# Patient Record
Sex: FEMALE | Race: WHITE | NOT HISPANIC OR LATINO | Employment: STUDENT | URBAN - METROPOLITAN AREA
[De-identification: names, ages, dates, MRNs, and addresses within clinical notes are randomized per-mention and may not be internally consistent; named-entity substitution may affect disease eponyms.]

---

## 2017-08-28 DIAGNOSIS — Z30.41 ENCOUNTER FOR SURVEILLANCE OF CONTRACEPTIVE PILLS: ICD-10-CM

## 2017-08-28 RX ORDER — DROSPIRENONE AND ETHINYL ESTRADIOL 0.02-3(28)
1 KIT ORAL DAILY
Qty: 28 TABLET | Refills: 2 | Status: SHIPPED | OUTPATIENT
Start: 2017-08-28 | End: 2017-11-06 | Stop reason: SDUPTHER

## 2017-11-06 ENCOUNTER — OFFICE VISIT (OUTPATIENT)
Dept: OBSTETRICS AND GYNECOLOGY | Facility: CLINIC | Age: 22
End: 2017-11-06
Payer: COMMERCIAL

## 2017-11-06 ENCOUNTER — PATIENT MESSAGE (OUTPATIENT)
Dept: OBSTETRICS AND GYNECOLOGY | Facility: CLINIC | Age: 22
End: 2017-11-06

## 2017-11-06 VITALS
DIASTOLIC BLOOD PRESSURE: 82 MMHG | SYSTOLIC BLOOD PRESSURE: 130 MMHG | BODY MASS INDEX: 33.02 KG/M2 | HEIGHT: 62 IN | WEIGHT: 179.44 LBS

## 2017-11-06 DIAGNOSIS — Z30.41 ENCOUNTER FOR SURVEILLANCE OF CONTRACEPTIVE PILLS: ICD-10-CM

## 2017-11-06 DIAGNOSIS — Z01.419 ENCOUNTER FOR GYNECOLOGICAL EXAMINATION (GENERAL) (ROUTINE) WITHOUT ABNORMAL FINDINGS: ICD-10-CM

## 2017-11-06 DIAGNOSIS — Z12.4 SCREENING FOR MALIGNANT NEOPLASM OF CERVIX: Primary | ICD-10-CM

## 2017-11-06 PROCEDURE — 99395 PREV VISIT EST AGE 18-39: CPT | Mod: S$GLB,,, | Performed by: OBSTETRICS & GYNECOLOGY

## 2017-11-06 PROCEDURE — 99999 PR PBB SHADOW E&M-EST. PATIENT-LVL II: CPT | Mod: PBBFAC,,, | Performed by: OBSTETRICS & GYNECOLOGY

## 2017-11-06 PROCEDURE — 88175 CYTOPATH C/V AUTO FLUID REDO: CPT

## 2017-11-06 RX ORDER — DROSPIRENONE AND ETHINYL ESTRADIOL 0.02-3(28)
1 KIT ORAL DAILY
Qty: 84 TABLET | Refills: 3 | Status: SHIPPED | OUTPATIENT
Start: 2017-11-06 | End: 2018-10-30 | Stop reason: SDUPTHER

## 2017-11-06 RX ORDER — IBUPROFEN 100 MG/5ML
1000 SUSPENSION, ORAL (FINAL DOSE FORM) ORAL DAILY
COMMUNITY
End: 2023-02-15

## 2017-11-06 NOTE — PROGRESS NOTES
Subjective:       Patient ID: Natividad Zaldivar is a 22 y.o. female.    Chief Complaint:  Well Woman (Annual Exam, Declines Std screening (No sex since last screening)    -- Last pap 16, Negative )      Patient Active Problem List   Diagnosis    Abdominal pain, other specified site    Acne    Menorrhagia       History of Present Illness  22 y.o. yo  here for annual exam. No gyn complaints. Doing well. Wants testing for ADHD. Happy with OCP. 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   Date of Last Pap: 10/5/2016    Review of Systems  Review of Systems   Constitutional: Negative for fatigue and unexpected weight change.   Respiratory: Negative for shortness of breath.    Cardiovascular: Negative for chest pain.   Gastrointestinal: Negative for abdominal pain, constipation, diarrhea, nausea and vomiting.   Genitourinary: Negative for dysuria.   Musculoskeletal: Negative for back pain.   Skin: Negative for rash.   Neurological: Negative for headaches.   Hematological: Does not bruise/bleed easily.   Psychiatric/Behavioral: Negative for behavioral problems.        Objective:   Physical Exam:   Constitutional: She is oriented to person, place, and time. Vital signs are normal. She appears well-developed and well-nourished. No distress.        Pulmonary/Chest: She exhibits no mass. Right breast exhibits no mass, no nipple discharge, no skin change, no tenderness, no bleeding and no swelling. Left breast exhibits no mass, no nipple discharge, no skin change, no tenderness, no bleeding and no swelling. Breasts are symmetrical.        Abdominal: Soft.  Normal appearance and bowel sounds are normal. She exhibits no distension and no mass. There is no tenderness. There is no rebound.     Genitourinary: Vagina normal and uterus normal. There is no rash, tenderness, lesion or injury on the right labia. There is no rash, tenderness, lesion or injury on the left labia. Uterus is not deviated, not enlarged, not fixed, not tender, not hosting fibroids and not experiencing uterine prolapse. Cervix is normal. Right adnexum displays no mass, no tenderness and no fullness. Left adnexum displays no mass, no tenderness and no fullness. No erythema, tenderness, rectocele, cystocele or unspecified prolapse of vaginal walls in the vagina. No vaginal discharge found. Cervix exhibits no motion tenderness, no discharge and no friability.           Musculoskeletal: Normal range of motion and moves all extremeties.      Lymphadenopathy:     She has no axillary adenopathy.        Right: No supraclavicular adenopathy present.        Left: No supraclavicular adenopathy present.    Neurological: She is alert and oriented to person, place, and time.    Skin: Skin is warm and dry.    Psychiatric: She has a normal mood and affect. Her behavior is normal. Judgment normal.        Assessment/ Plan:     1. Screening for malignant neoplasm of cervix  Liquid-based pap smear, screening   2. Encounter for surveillance of contraceptive pills  drospirenone-ethinyl estradiol (KHADIJAH) 3-0.02 mg per tablet   3. Encounter for gynecological examination (general) (routine) without abnormal findings         Follow-up with me in 1 year

## 2018-10-30 ENCOUNTER — PATIENT MESSAGE (OUTPATIENT)
Dept: OBSTETRICS AND GYNECOLOGY | Facility: CLINIC | Age: 23
End: 2018-10-30

## 2018-10-30 DIAGNOSIS — Z30.41 ENCOUNTER FOR SURVEILLANCE OF CONTRACEPTIVE PILLS: ICD-10-CM

## 2018-10-31 RX ORDER — DROSPIRENONE AND ETHINYL ESTRADIOL 0.02-3(28)
1 KIT ORAL DAILY
Qty: 84 TABLET | Refills: 0 | Status: SHIPPED | OUTPATIENT
Start: 2018-10-31 | End: 2019-01-18 | Stop reason: SDUPTHER

## 2019-01-18 ENCOUNTER — OFFICE VISIT (OUTPATIENT)
Dept: OBSTETRICS AND GYNECOLOGY | Facility: CLINIC | Age: 24
End: 2019-01-18
Payer: COMMERCIAL

## 2019-01-18 VITALS
HEIGHT: 62 IN | DIASTOLIC BLOOD PRESSURE: 80 MMHG | WEIGHT: 189.94 LBS | SYSTOLIC BLOOD PRESSURE: 122 MMHG | BODY MASS INDEX: 34.95 KG/M2

## 2019-01-18 DIAGNOSIS — Z12.4 ENCOUNTER FOR PAPANICOLAOU SMEAR FOR CERVICAL CANCER SCREENING: Primary | ICD-10-CM

## 2019-01-18 DIAGNOSIS — Z01.419 ENCOUNTER FOR GYNECOLOGICAL EXAMINATION (GENERAL) (ROUTINE) WITHOUT ABNORMAL FINDINGS: ICD-10-CM

## 2019-01-18 DIAGNOSIS — Z30.41 ENCOUNTER FOR SURVEILLANCE OF CONTRACEPTIVE PILLS: ICD-10-CM

## 2019-01-18 PROCEDURE — 99999 PR PBB SHADOW E&M-EST. PATIENT-LVL III: CPT | Mod: PBBFAC,,, | Performed by: OBSTETRICS & GYNECOLOGY

## 2019-01-18 PROCEDURE — 99395 PR PREVENTIVE VISIT,EST,18-39: ICD-10-PCS | Mod: S$GLB,,, | Performed by: OBSTETRICS & GYNECOLOGY

## 2019-01-18 PROCEDURE — 99999 PR PBB SHADOW E&M-EST. PATIENT-LVL III: ICD-10-PCS | Mod: PBBFAC,,, | Performed by: OBSTETRICS & GYNECOLOGY

## 2019-01-18 PROCEDURE — 99395 PREV VISIT EST AGE 18-39: CPT | Mod: S$GLB,,, | Performed by: OBSTETRICS & GYNECOLOGY

## 2019-01-18 PROCEDURE — 88175 CYTOPATH C/V AUTO FLUID REDO: CPT

## 2019-01-18 RX ORDER — DROSPIRENONE AND ETHINYL ESTRADIOL 0.02-3(28)
1 KIT ORAL DAILY
Qty: 84 TABLET | Refills: 3 | Status: SHIPPED | OUTPATIENT
Start: 2019-01-18 | End: 2021-10-15 | Stop reason: SDUPTHER

## 2019-01-18 NOTE — PROGRESS NOTES
Subjective:       Patient ID: Natividad Zaldivar is a 23 y.o. female.    Chief Complaint:  Annual Exam (last pap 2017 normal)      Patient Active Problem List   Diagnosis    Abdominal pain, other specified site    Acne    Menorrhagia       History of Present Illness  23 y.o. yo  here for annual exam. Has been off OCP and wants to restart. Periods were irregular prior to OCP and dx with PCOS in the past. Has been on OCP for awhile and then got off when school clinic told her to stop because BP was up. Never had BP problems before on OCP. Rec ok to restart OCP and monitor BP. All questions answered.     Past Medical History:   Diagnosis Date    Acne     Menorrhagia        Past Surgical History:   Procedure Laterality Date    WISDOM TOOTH EXTRACTION  2015       OB History    Para Term  AB Living   0 0 0 0 0 0   SAB TAB Ectopic Multiple Live Births   0 0 0 0           Obstetric Comments   Menarche 10       Patient's last menstrual period was 2018.   Date of Last Pap: 2017    Review of Systems  Review of Systems   Constitutional: Negative for fatigue and unexpected weight change.   Respiratory: Negative for shortness of breath.    Cardiovascular: Negative for chest pain.   Gastrointestinal: Negative for abdominal pain, constipation, diarrhea, nausea and vomiting.   Genitourinary: Negative for dysuria.   Musculoskeletal: Negative for back pain.   Skin: Negative for rash.   Neurological: Negative for headaches.   Hematological: Does not bruise/bleed easily.   Psychiatric/Behavioral: Negative for behavioral problems.        Objective:   Physical Exam:   Constitutional: She is oriented to person, place, and time. Vital signs are normal. She appears well-developed and well-nourished. No distress.        Pulmonary/Chest: She exhibits no mass. Right breast exhibits no mass, no nipple discharge, no skin change, no tenderness, no bleeding and no swelling. Left breast exhibits no mass, no nipple  discharge, no skin change, no tenderness, no bleeding and no swelling. Breasts are symmetrical.        Abdominal: Soft. Normal appearance and bowel sounds are normal. She exhibits no distension and no mass. There is no tenderness. There is no rebound.     Genitourinary: Vagina normal and uterus normal. There is no rash, tenderness, lesion or injury on the right labia. There is no rash, tenderness, lesion or injury on the left labia. Uterus is not deviated, not enlarged, not fixed, not tender, not hosting fibroids and not experiencing uterine prolapse. Cervix is normal. Right adnexum displays no mass, no tenderness and no fullness. Left adnexum displays no mass, no tenderness and no fullness. No erythema, tenderness, rectocele, cystocele or unspecified prolapse of vaginal walls in the vagina. No vaginal discharge found. Cervix exhibits no motion tenderness, no discharge and no friability.           Musculoskeletal: Normal range of motion and moves all extremeties.      Lymphadenopathy:     She has no axillary adenopathy.        Right: No supraclavicular adenopathy present.        Left: No supraclavicular adenopathy present.    Neurological: She is alert and oriented to person, place, and time.    Skin: Skin is warm and dry.    Psychiatric: She has a normal mood and affect. Her behavior is normal. Judgment normal.        Assessment/ Plan:     1. Encounter for Papanicolaou smear for cervical cancer screening  Liquid-based pap smear, screening   2. Encounter for surveillance of contraceptive pills  drospirenone-ethinyl estradiol (KHADIJAH) 3-0.02 mg per tablet   3. Encounter for gynecological examination (general) (routine) without abnormal findings         Follow-up with me in 1 year

## 2021-10-15 ENCOUNTER — OFFICE VISIT (OUTPATIENT)
Dept: OBSTETRICS AND GYNECOLOGY | Facility: CLINIC | Age: 26
End: 2021-10-15
Attending: OBSTETRICS & GYNECOLOGY
Payer: COMMERCIAL

## 2021-10-15 VITALS
HEIGHT: 62 IN | DIASTOLIC BLOOD PRESSURE: 80 MMHG | SYSTOLIC BLOOD PRESSURE: 122 MMHG | BODY MASS INDEX: 37.99 KG/M2 | WEIGHT: 206.44 LBS

## 2021-10-15 DIAGNOSIS — Z01.419 ENCOUNTER FOR GYNECOLOGICAL EXAMINATION (GENERAL) (ROUTINE) WITHOUT ABNORMAL FINDINGS: ICD-10-CM

## 2021-10-15 DIAGNOSIS — Z30.41 ENCOUNTER FOR SURVEILLANCE OF CONTRACEPTIVE PILLS: ICD-10-CM

## 2021-10-15 DIAGNOSIS — Z12.4 ENCOUNTER FOR PAPANICOLAOU SMEAR FOR CERVICAL CANCER SCREENING: Primary | ICD-10-CM

## 2021-10-15 PROCEDURE — 3008F PR BODY MASS INDEX (BMI) DOCUMENTED: ICD-10-PCS | Mod: CPTII,S$GLB,, | Performed by: OBSTETRICS & GYNECOLOGY

## 2021-10-15 PROCEDURE — 3074F PR MOST RECENT SYSTOLIC BLOOD PRESSURE < 130 MM HG: ICD-10-PCS | Mod: CPTII,S$GLB,, | Performed by: OBSTETRICS & GYNECOLOGY

## 2021-10-15 PROCEDURE — 3074F SYST BP LT 130 MM HG: CPT | Mod: CPTII,S$GLB,, | Performed by: OBSTETRICS & GYNECOLOGY

## 2021-10-15 PROCEDURE — 99395 PREV VISIT EST AGE 18-39: CPT | Mod: S$GLB,,, | Performed by: OBSTETRICS & GYNECOLOGY

## 2021-10-15 PROCEDURE — 1159F PR MEDICATION LIST DOCUMENTED IN MEDICAL RECORD: ICD-10-PCS | Mod: CPTII,S$GLB,, | Performed by: OBSTETRICS & GYNECOLOGY

## 2021-10-15 PROCEDURE — 1160F PR REVIEW ALL MEDS BY PRESCRIBER/CLIN PHARMACIST DOCUMENTED: ICD-10-PCS | Mod: CPTII,S$GLB,, | Performed by: OBSTETRICS & GYNECOLOGY

## 2021-10-15 PROCEDURE — 1160F RVW MEDS BY RX/DR IN RCRD: CPT | Mod: CPTII,S$GLB,, | Performed by: OBSTETRICS & GYNECOLOGY

## 2021-10-15 PROCEDURE — 99395 PR PREVENTIVE VISIT,EST,18-39: ICD-10-PCS | Mod: S$GLB,,, | Performed by: OBSTETRICS & GYNECOLOGY

## 2021-10-15 PROCEDURE — 88175 CYTOPATH C/V AUTO FLUID REDO: CPT | Performed by: OBSTETRICS & GYNECOLOGY

## 2021-10-15 PROCEDURE — 3079F DIAST BP 80-89 MM HG: CPT | Mod: CPTII,S$GLB,, | Performed by: OBSTETRICS & GYNECOLOGY

## 2021-10-15 PROCEDURE — 99999 PR PBB SHADOW E&M-EST. PATIENT-LVL III: CPT | Mod: PBBFAC,,, | Performed by: OBSTETRICS & GYNECOLOGY

## 2021-10-15 PROCEDURE — 99999 PR PBB SHADOW E&M-EST. PATIENT-LVL III: ICD-10-PCS | Mod: PBBFAC,,, | Performed by: OBSTETRICS & GYNECOLOGY

## 2021-10-15 PROCEDURE — 3008F BODY MASS INDEX DOCD: CPT | Mod: CPTII,S$GLB,, | Performed by: OBSTETRICS & GYNECOLOGY

## 2021-10-15 PROCEDURE — 3079F PR MOST RECENT DIASTOLIC BLOOD PRESSURE 80-89 MM HG: ICD-10-PCS | Mod: CPTII,S$GLB,, | Performed by: OBSTETRICS & GYNECOLOGY

## 2021-10-15 PROCEDURE — 1159F MED LIST DOCD IN RCRD: CPT | Mod: CPTII,S$GLB,, | Performed by: OBSTETRICS & GYNECOLOGY

## 2021-10-15 RX ORDER — DROSPIRENONE AND ETHINYL ESTRADIOL 0.02-3(28)
1 KIT ORAL DAILY
Qty: 84 TABLET | Refills: 3 | Status: SHIPPED | OUTPATIENT
Start: 2021-10-15 | End: 2022-09-14

## 2021-11-04 ENCOUNTER — OFFICE VISIT (OUTPATIENT)
Dept: URGENT CARE | Facility: CLINIC | Age: 26
End: 2021-11-04
Payer: COMMERCIAL

## 2021-11-04 VITALS
OXYGEN SATURATION: 100 % | BODY MASS INDEX: 37.91 KG/M2 | SYSTOLIC BLOOD PRESSURE: 127 MMHG | WEIGHT: 206 LBS | TEMPERATURE: 97 F | HEART RATE: 69 BPM | HEIGHT: 62 IN | RESPIRATION RATE: 18 BRPM | DIASTOLIC BLOOD PRESSURE: 83 MMHG

## 2021-11-04 DIAGNOSIS — Z23 NEED FOR TDAP VACCINATION: ICD-10-CM

## 2021-11-04 DIAGNOSIS — Z51.89 VISIT FOR WOUND CHECK: Primary | ICD-10-CM

## 2021-11-04 PROCEDURE — 1159F MED LIST DOCD IN RCRD: CPT | Mod: CPTII,S$GLB,, | Performed by: STUDENT IN AN ORGANIZED HEALTH CARE EDUCATION/TRAINING PROGRAM

## 2021-11-04 PROCEDURE — 99202 OFFICE O/P NEW SF 15 MIN: CPT | Mod: 25,S$GLB,, | Performed by: STUDENT IN AN ORGANIZED HEALTH CARE EDUCATION/TRAINING PROGRAM

## 2021-11-04 PROCEDURE — 3079F DIAST BP 80-89 MM HG: CPT | Mod: CPTII,S$GLB,, | Performed by: STUDENT IN AN ORGANIZED HEALTH CARE EDUCATION/TRAINING PROGRAM

## 2021-11-04 PROCEDURE — 90715 TDAP VACCINE 7 YRS/> IM: CPT | Mod: S$GLB,,, | Performed by: STUDENT IN AN ORGANIZED HEALTH CARE EDUCATION/TRAINING PROGRAM

## 2021-11-04 PROCEDURE — 1160F RVW MEDS BY RX/DR IN RCRD: CPT | Mod: CPTII,S$GLB,, | Performed by: STUDENT IN AN ORGANIZED HEALTH CARE EDUCATION/TRAINING PROGRAM

## 2021-11-04 PROCEDURE — 99202 PR OFFICE/OUTPT VISIT, NEW, LEVL II, 15-29 MIN: ICD-10-PCS | Mod: 25,S$GLB,, | Performed by: STUDENT IN AN ORGANIZED HEALTH CARE EDUCATION/TRAINING PROGRAM

## 2021-11-04 PROCEDURE — 3008F BODY MASS INDEX DOCD: CPT | Mod: CPTII,S$GLB,, | Performed by: STUDENT IN AN ORGANIZED HEALTH CARE EDUCATION/TRAINING PROGRAM

## 2021-11-04 PROCEDURE — 1159F PR MEDICATION LIST DOCUMENTED IN MEDICAL RECORD: ICD-10-PCS | Mod: CPTII,S$GLB,, | Performed by: STUDENT IN AN ORGANIZED HEALTH CARE EDUCATION/TRAINING PROGRAM

## 2021-11-04 PROCEDURE — 3079F PR MOST RECENT DIASTOLIC BLOOD PRESSURE 80-89 MM HG: ICD-10-PCS | Mod: CPTII,S$GLB,, | Performed by: STUDENT IN AN ORGANIZED HEALTH CARE EDUCATION/TRAINING PROGRAM

## 2021-11-04 PROCEDURE — 3074F SYST BP LT 130 MM HG: CPT | Mod: CPTII,S$GLB,, | Performed by: STUDENT IN AN ORGANIZED HEALTH CARE EDUCATION/TRAINING PROGRAM

## 2021-11-04 PROCEDURE — 90471 IMMUNIZATION ADMIN: CPT | Mod: S$GLB,,, | Performed by: STUDENT IN AN ORGANIZED HEALTH CARE EDUCATION/TRAINING PROGRAM

## 2021-11-04 PROCEDURE — 90715 TDAP VACCINE GREATER THAN OR EQUAL TO 7YO IM: ICD-10-PCS | Mod: S$GLB,,, | Performed by: STUDENT IN AN ORGANIZED HEALTH CARE EDUCATION/TRAINING PROGRAM

## 2021-11-04 PROCEDURE — 1160F PR REVIEW ALL MEDS BY PRESCRIBER/CLIN PHARMACIST DOCUMENTED: ICD-10-PCS | Mod: CPTII,S$GLB,, | Performed by: STUDENT IN AN ORGANIZED HEALTH CARE EDUCATION/TRAINING PROGRAM

## 2021-11-04 PROCEDURE — 90471 TDAP VACCINE GREATER THAN OR EQUAL TO 7YO IM: ICD-10-PCS | Mod: S$GLB,,, | Performed by: STUDENT IN AN ORGANIZED HEALTH CARE EDUCATION/TRAINING PROGRAM

## 2021-11-04 PROCEDURE — 3074F PR MOST RECENT SYSTOLIC BLOOD PRESSURE < 130 MM HG: ICD-10-PCS | Mod: CPTII,S$GLB,, | Performed by: STUDENT IN AN ORGANIZED HEALTH CARE EDUCATION/TRAINING PROGRAM

## 2021-11-04 PROCEDURE — 3008F PR BODY MASS INDEX (BMI) DOCUMENTED: ICD-10-PCS | Mod: CPTII,S$GLB,, | Performed by: STUDENT IN AN ORGANIZED HEALTH CARE EDUCATION/TRAINING PROGRAM

## 2021-12-02 ENCOUNTER — IMMUNIZATION (OUTPATIENT)
Dept: INTERNAL MEDICINE | Facility: CLINIC | Age: 26
End: 2021-12-02
Payer: COMMERCIAL

## 2021-12-02 DIAGNOSIS — Z23 NEED FOR VACCINATION: Primary | ICD-10-CM

## 2021-12-02 PROCEDURE — 0004A COVID-19, MRNA, LNP-S, PF, 30 MCG/0.3 ML DOSE VACCINE: CPT | Mod: CV19,PBBFAC | Performed by: INTERNAL MEDICINE

## 2021-12-16 ENCOUNTER — OFFICE VISIT (OUTPATIENT)
Dept: URGENT CARE | Facility: CLINIC | Age: 26
End: 2021-12-16
Payer: COMMERCIAL

## 2021-12-16 ENCOUNTER — CLINICAL SUPPORT (OUTPATIENT)
Dept: URGENT CARE | Facility: CLINIC | Age: 26
End: 2021-12-16
Payer: COMMERCIAL

## 2021-12-16 VITALS — BODY MASS INDEX: 34.96 KG/M2 | WEIGHT: 190 LBS | HEIGHT: 62 IN

## 2021-12-16 DIAGNOSIS — Z20.822 CONTACT WITH AND (SUSPECTED) EXPOSURE TO COVID-19: Primary | ICD-10-CM

## 2021-12-16 LAB
CTP QC/QA: YES
SARS-COV-2 RDRP RESP QL NAA+PROBE: NEGATIVE

## 2021-12-16 PROCEDURE — 99211 PR OFFICE/OUTPT VISIT, EST, LEVL I: ICD-10-PCS | Mod: S$GLB,,, | Performed by: INTERNAL MEDICINE

## 2021-12-16 PROCEDURE — U0002 COVID-19 LAB TEST NON-CDC: HCPCS | Mod: QW,S$GLB,, | Performed by: INTERNAL MEDICINE

## 2021-12-16 PROCEDURE — U0002: ICD-10-PCS | Mod: QW,S$GLB,, | Performed by: INTERNAL MEDICINE

## 2021-12-16 PROCEDURE — 99211 OFF/OP EST MAY X REQ PHY/QHP: CPT | Mod: S$GLB,,, | Performed by: INTERNAL MEDICINE

## 2021-12-29 ENCOUNTER — OFFICE VISIT (OUTPATIENT)
Dept: URGENT CARE | Facility: CLINIC | Age: 26
End: 2021-12-29
Payer: COMMERCIAL

## 2021-12-29 VITALS
TEMPERATURE: 99 F | SYSTOLIC BLOOD PRESSURE: 148 MMHG | RESPIRATION RATE: 16 BRPM | HEIGHT: 62 IN | DIASTOLIC BLOOD PRESSURE: 98 MMHG | BODY MASS INDEX: 35.88 KG/M2 | WEIGHT: 195 LBS | OXYGEN SATURATION: 100 % | HEART RATE: 75 BPM

## 2021-12-29 DIAGNOSIS — J34.9 SINUS PROBLEM: Primary | ICD-10-CM

## 2021-12-29 LAB
CTP QC/QA: YES
SARS-COV-2 RDRP RESP QL NAA+PROBE: NEGATIVE

## 2021-12-29 PROCEDURE — 1159F PR MEDICATION LIST DOCUMENTED IN MEDICAL RECORD: ICD-10-PCS | Mod: CPTII,S$GLB,, | Performed by: INTERNAL MEDICINE

## 2021-12-29 PROCEDURE — U0002 COVID-19 LAB TEST NON-CDC: HCPCS | Mod: QW,S$GLB,, | Performed by: INTERNAL MEDICINE

## 2021-12-29 PROCEDURE — 1159F MED LIST DOCD IN RCRD: CPT | Mod: CPTII,S$GLB,, | Performed by: INTERNAL MEDICINE

## 2021-12-29 PROCEDURE — 99214 PR OFFICE/OUTPT VISIT, EST, LEVL IV, 30-39 MIN: ICD-10-PCS | Mod: S$GLB,,, | Performed by: INTERNAL MEDICINE

## 2021-12-29 PROCEDURE — 3077F PR MOST RECENT SYSTOLIC BLOOD PRESSURE >= 140 MM HG: ICD-10-PCS | Mod: CPTII,S$GLB,, | Performed by: INTERNAL MEDICINE

## 2021-12-29 PROCEDURE — 1160F RVW MEDS BY RX/DR IN RCRD: CPT | Mod: CPTII,S$GLB,, | Performed by: INTERNAL MEDICINE

## 2021-12-29 PROCEDURE — 99214 OFFICE O/P EST MOD 30 MIN: CPT | Mod: S$GLB,,, | Performed by: INTERNAL MEDICINE

## 2021-12-29 PROCEDURE — 3008F PR BODY MASS INDEX (BMI) DOCUMENTED: ICD-10-PCS | Mod: CPTII,S$GLB,, | Performed by: INTERNAL MEDICINE

## 2021-12-29 PROCEDURE — 3080F DIAST BP >= 90 MM HG: CPT | Mod: CPTII,S$GLB,, | Performed by: INTERNAL MEDICINE

## 2021-12-29 PROCEDURE — 3008F BODY MASS INDEX DOCD: CPT | Mod: CPTII,S$GLB,, | Performed by: INTERNAL MEDICINE

## 2021-12-29 PROCEDURE — 3080F PR MOST RECENT DIASTOLIC BLOOD PRESSURE >= 90 MM HG: ICD-10-PCS | Mod: CPTII,S$GLB,, | Performed by: INTERNAL MEDICINE

## 2021-12-29 PROCEDURE — 3077F SYST BP >= 140 MM HG: CPT | Mod: CPTII,S$GLB,, | Performed by: INTERNAL MEDICINE

## 2021-12-29 PROCEDURE — 1160F PR REVIEW ALL MEDS BY PRESCRIBER/CLIN PHARMACIST DOCUMENTED: ICD-10-PCS | Mod: CPTII,S$GLB,, | Performed by: INTERNAL MEDICINE

## 2021-12-29 PROCEDURE — U0002: ICD-10-PCS | Mod: QW,S$GLB,, | Performed by: INTERNAL MEDICINE

## 2022-04-19 ENCOUNTER — LAB VISIT (OUTPATIENT)
Dept: LAB | Facility: HOSPITAL | Age: 27
End: 2022-04-19
Payer: COMMERCIAL

## 2022-04-19 ENCOUNTER — HOSPITAL ENCOUNTER (OUTPATIENT)
Dept: RADIOLOGY | Facility: HOSPITAL | Age: 27
Discharge: HOME | End: 2022-04-19
Attending: PHYSICIAN ASSISTANT
Payer: COMMERCIAL

## 2022-04-19 ENCOUNTER — OFFICE VISIT (OUTPATIENT)
Dept: INTERNAL MEDICINE | Facility: CLINIC | Age: 27
End: 2022-04-19
Payer: COMMERCIAL

## 2022-04-19 VITALS
BODY MASS INDEX: 35.8 KG/M2 | TEMPERATURE: 98 F | HEART RATE: 94 BPM | OXYGEN SATURATION: 99 % | SYSTOLIC BLOOD PRESSURE: 132 MMHG | WEIGHT: 194.56 LBS | DIASTOLIC BLOOD PRESSURE: 90 MMHG | HEIGHT: 62 IN

## 2022-04-19 DIAGNOSIS — R10.9 LEFT SIDED ABDOMINAL PAIN: ICD-10-CM

## 2022-04-19 DIAGNOSIS — K59.00 CONSTIPATION, UNSPECIFIED CONSTIPATION TYPE: ICD-10-CM

## 2022-04-19 DIAGNOSIS — R10.9 LEFT SIDED ABDOMINAL PAIN: Primary | ICD-10-CM

## 2022-04-19 DIAGNOSIS — M54.9 BACK PAIN, UNSPECIFIED BACK LOCATION, UNSPECIFIED BACK PAIN LATERALITY, UNSPECIFIED CHRONICITY: ICD-10-CM

## 2022-04-19 LAB
ALBUMIN SERPL BCP-MCNC: 3.9 G/DL (ref 3.5–5.2)
ALP SERPL-CCNC: 58 U/L (ref 55–135)
ALT SERPL W/O P-5'-P-CCNC: 22 U/L (ref 10–44)
ANION GAP SERPL CALC-SCNC: 9 MMOL/L (ref 8–16)
AST SERPL-CCNC: 16 U/L (ref 10–40)
B-HCG UR QL: NEGATIVE
BACTERIA #/AREA URNS AUTO: ABNORMAL /HPF
BASOPHILS # BLD AUTO: 0.04 K/UL (ref 0–0.2)
BASOPHILS NFR BLD: 0.4 % (ref 0–1.9)
BILIRUB SERPL-MCNC: 0.4 MG/DL (ref 0.1–1)
BILIRUB UR QL STRIP: NEGATIVE
BUN SERPL-MCNC: 9 MG/DL (ref 6–20)
CALCIUM SERPL-MCNC: 9.9 MG/DL (ref 8.7–10.5)
CHLORIDE SERPL-SCNC: 104 MMOL/L (ref 95–110)
CLARITY UR REFRACT.AUTO: ABNORMAL
CO2 SERPL-SCNC: 25 MMOL/L (ref 23–29)
COLOR UR AUTO: YELLOW
CREAT SERPL-MCNC: 0.8 MG/DL (ref 0.5–1.4)
CTP QC/QA: YES
DIFFERENTIAL METHOD: NORMAL
EOSINOPHIL # BLD AUTO: 0.1 K/UL (ref 0–0.5)
EOSINOPHIL NFR BLD: 1.4 % (ref 0–8)
ERYTHROCYTE [DISTWIDTH] IN BLOOD BY AUTOMATED COUNT: 12.2 % (ref 11.5–14.5)
EST. GFR  (AFRICAN AMERICAN): >60 ML/MIN/1.73 M^2
EST. GFR  (NON AFRICAN AMERICAN): >60 ML/MIN/1.73 M^2
GLUCOSE SERPL-MCNC: 91 MG/DL (ref 70–110)
GLUCOSE UR QL STRIP: NEGATIVE
HCT VFR BLD AUTO: 42 % (ref 37–48.5)
HGB BLD-MCNC: 13.8 G/DL (ref 12–16)
HGB UR QL STRIP: ABNORMAL
IMM GRANULOCYTES # BLD AUTO: 0.02 K/UL (ref 0–0.04)
IMM GRANULOCYTES NFR BLD AUTO: 0.2 % (ref 0–0.5)
KETONES UR QL STRIP: NEGATIVE
LEUKOCYTE ESTERASE UR QL STRIP: ABNORMAL
LYMPHOCYTES # BLD AUTO: 3.7 K/UL (ref 1–4.8)
LYMPHOCYTES NFR BLD: 38.6 % (ref 18–48)
MCH RBC QN AUTO: 30 PG (ref 27–31)
MCHC RBC AUTO-ENTMCNC: 32.9 G/DL (ref 32–36)
MCV RBC AUTO: 91 FL (ref 82–98)
MICROSCOPIC COMMENT: ABNORMAL
MONOCYTES # BLD AUTO: 0.6 K/UL (ref 0.3–1)
MONOCYTES NFR BLD: 6.3 % (ref 4–15)
NEUTROPHILS # BLD AUTO: 5.1 K/UL (ref 1.8–7.7)
NEUTROPHILS NFR BLD: 53.1 % (ref 38–73)
NITRITE UR QL STRIP: NEGATIVE
NRBC BLD-RTO: 0 /100 WBC
PH UR STRIP: 6 [PH] (ref 5–8)
PLATELET # BLD AUTO: 349 K/UL (ref 150–450)
PMV BLD AUTO: 9.5 FL (ref 9.2–12.9)
POTASSIUM SERPL-SCNC: 4.1 MMOL/L (ref 3.5–5.1)
PROT SERPL-MCNC: 7.5 G/DL (ref 6–8.4)
PROT UR QL STRIP: NEGATIVE
RBC # BLD AUTO: 4.6 M/UL (ref 4–5.4)
RBC #/AREA URNS AUTO: 1 /HPF (ref 0–4)
SODIUM SERPL-SCNC: 138 MMOL/L (ref 136–145)
SP GR UR STRIP: 1.01 (ref 1–1.03)
SQUAMOUS #/AREA URNS AUTO: 4 /HPF
TSH SERPL DL<=0.005 MIU/L-ACNC: 1.56 UIU/ML (ref 0.4–4)
URN SPEC COLLECT METH UR: ABNORMAL
WBC # BLD AUTO: 9.54 K/UL (ref 3.9–12.7)
WBC #/AREA URNS AUTO: 4 /HPF (ref 0–5)

## 2022-04-19 PROCEDURE — 3080F PR MOST RECENT DIASTOLIC BLOOD PRESSURE >= 90 MM HG: ICD-10-PCS | Mod: CPTII,S$GLB,, | Performed by: PHYSICIAN ASSISTANT

## 2022-04-19 PROCEDURE — 99215 PR OFFICE/OUTPT VISIT, EST, LEVL V, 40-54 MIN: ICD-10-PCS | Mod: S$GLB,,, | Performed by: PHYSICIAN ASSISTANT

## 2022-04-19 PROCEDURE — 99999 PR PBB SHADOW E&M-EST. PATIENT-LVL IV: CPT | Mod: PBBFAC,,, | Performed by: PHYSICIAN ASSISTANT

## 2022-04-19 PROCEDURE — 3075F SYST BP GE 130 - 139MM HG: CPT | Mod: CPTII,S$GLB,, | Performed by: PHYSICIAN ASSISTANT

## 2022-04-19 PROCEDURE — 84443 ASSAY THYROID STIM HORMONE: CPT | Performed by: PHYSICIAN ASSISTANT

## 2022-04-19 PROCEDURE — 80053 COMPREHEN METABOLIC PANEL: CPT | Performed by: PHYSICIAN ASSISTANT

## 2022-04-19 PROCEDURE — 74177 CT ABDOMEN PELVIS WITH CONTRAST: ICD-10-PCS | Mod: 26,,, | Performed by: RADIOLOGY

## 2022-04-19 PROCEDURE — 25500020 PHARM REV CODE 255: Performed by: PHYSICIAN ASSISTANT

## 2022-04-19 PROCEDURE — 1160F PR REVIEW ALL MEDS BY PRESCRIBER/CLIN PHARMACIST DOCUMENTED: ICD-10-PCS | Mod: CPTII,S$GLB,, | Performed by: PHYSICIAN ASSISTANT

## 2022-04-19 PROCEDURE — 81001 URINALYSIS AUTO W/SCOPE: CPT | Performed by: PHYSICIAN ASSISTANT

## 2022-04-19 PROCEDURE — 3008F BODY MASS INDEX DOCD: CPT | Mod: CPTII,S$GLB,, | Performed by: PHYSICIAN ASSISTANT

## 2022-04-19 PROCEDURE — 3008F PR BODY MASS INDEX (BMI) DOCUMENTED: ICD-10-PCS | Mod: CPTII,S$GLB,, | Performed by: PHYSICIAN ASSISTANT

## 2022-04-19 PROCEDURE — 3080F DIAST BP >= 90 MM HG: CPT | Mod: CPTII,S$GLB,, | Performed by: PHYSICIAN ASSISTANT

## 2022-04-19 PROCEDURE — 3075F PR MOST RECENT SYSTOLIC BLOOD PRESS GE 130-139MM HG: ICD-10-PCS | Mod: CPTII,S$GLB,, | Performed by: PHYSICIAN ASSISTANT

## 2022-04-19 PROCEDURE — 85025 COMPLETE CBC W/AUTO DIFF WBC: CPT | Performed by: PHYSICIAN ASSISTANT

## 2022-04-19 PROCEDURE — 1160F RVW MEDS BY RX/DR IN RCRD: CPT | Mod: CPTII,S$GLB,, | Performed by: PHYSICIAN ASSISTANT

## 2022-04-19 PROCEDURE — 74177 CT ABD & PELVIS W/CONTRAST: CPT | Mod: 26,,, | Performed by: RADIOLOGY

## 2022-04-19 PROCEDURE — 99215 OFFICE O/P EST HI 40 MIN: CPT | Mod: S$GLB,,, | Performed by: PHYSICIAN ASSISTANT

## 2022-04-19 PROCEDURE — 87086 URINE CULTURE/COLONY COUNT: CPT | Performed by: PHYSICIAN ASSISTANT

## 2022-04-19 PROCEDURE — 99999 PR PBB SHADOW E&M-EST. PATIENT-LVL IV: ICD-10-PCS | Mod: PBBFAC,,, | Performed by: PHYSICIAN ASSISTANT

## 2022-04-19 PROCEDURE — 36415 COLL VENOUS BLD VENIPUNCTURE: CPT | Performed by: PHYSICIAN ASSISTANT

## 2022-04-19 PROCEDURE — A9698 NON-RAD CONTRAST MATERIALNOC: HCPCS | Performed by: PHYSICIAN ASSISTANT

## 2022-04-19 PROCEDURE — 81025 URINE PREGNANCY TEST: CPT | Mod: S$GLB,,, | Performed by: PHYSICIAN ASSISTANT

## 2022-04-19 PROCEDURE — 1159F MED LIST DOCD IN RCRD: CPT | Mod: CPTII,S$GLB,, | Performed by: PHYSICIAN ASSISTANT

## 2022-04-19 PROCEDURE — 81025 POCT URINE PREGNANCY: ICD-10-PCS | Mod: S$GLB,,, | Performed by: PHYSICIAN ASSISTANT

## 2022-04-19 PROCEDURE — 74177 CT ABD & PELVIS W/CONTRAST: CPT | Mod: TC

## 2022-04-19 PROCEDURE — 1159F PR MEDICATION LIST DOCUMENTED IN MEDICAL RECORD: ICD-10-PCS | Mod: CPTII,S$GLB,, | Performed by: PHYSICIAN ASSISTANT

## 2022-04-19 RX ADMIN — IOHEXOL 1000 ML: 12 SOLUTION ORAL at 03:04

## 2022-04-19 RX ADMIN — IOHEXOL 100 ML: 350 INJECTION, SOLUTION INTRAVENOUS at 04:04

## 2022-04-19 NOTE — PROGRESS NOTES
Subjective:       Patient ID: Natividad Zaldivar is a 26 y.o. female.        Chief Complaint: Constipation    Natividad Zaldivar is an established patient of [facility] here today for urgent care visit.    4/12 seen at [facility] and dx with sinusitis, covid and flu neg, tx with zpak, prednisone  4/13 woke up and felt like something was left in LLQ after bowel movement  4/14 she began to have LLQ abdominal pain, decided she needed a laxative so took dulcolax, had a bowel movement that was fairly liquid but did not have relief in constipation feeling and fullness, didn't help too much with the pain  4/15 she took dulcolax again and felt this was more effective in relieving her constipation, still liquid but pain improved, still not complete relief and felt like she was still backed up    Since 4/16, she has not taken any further laxatives because she was nervous to keep taking    Yesterday she had some pellet like stool    Left sided lower back pain, described as achy, started 2 days ago after doing a lot of walking, radiates into left buttocks    No blood or mucus, some nausea but no vomiting, no further LLQ abdominal pain but now with left sided low back pain    No urinary sx    Temp of 99 2 days last week but none for the past 3 days         Review of Systems   Constitutional: Negative for chills, diaphoresis, fatigue and fever.   HENT: Negative for congestion and sore throat.    Eyes: Negative for visual disturbance.   Respiratory: Negative for cough, chest tightness and shortness of breath.    Cardiovascular: Negative for chest pain, palpitations and leg swelling.   Gastrointestinal: Positive for abdominal pain, constipation and nausea. Negative for blood in stool, diarrhea and vomiting.   Genitourinary: Negative for dysuria, frequency, hematuria and urgency.   Musculoskeletal: Positive for back pain. Negative for arthralgias.   Skin: Negative for rash.   Neurological: Negative for dizziness, syncope, weakness and headaches.    Psychiatric/Behavioral: Negative for dysphoric mood and sleep disturbance. The patient is not nervous/anxious.        Objective:      Physical Exam  Vitals and nursing note reviewed.   Constitutional:       Appearance: Normal appearance. She is well-developed.   HENT:      Head: Normocephalic.      Right Ear: External ear normal.      Left Ear: External ear normal.   Eyes:      Pupils: Pupils are equal, round, and reactive to light.   Cardiovascular:      Rate and Rhythm: Normal rate and regular rhythm.      Heart sounds: Normal heart sounds. No murmur heard.    No friction rub. No gallop.   Pulmonary:      Effort: Pulmonary effort is normal. No respiratory distress.      Breath sounds: Normal breath sounds.   Abdominal:      Palpations: Abdomen is soft.      Tenderness: There is abdominal tenderness in the suprapubic area, left upper quadrant and left lower quadrant. There is no right CVA tenderness or left CVA tenderness.       Genitourinary:     Rectum: Normal.   Musculoskeletal:      Lumbar back: Spasms (left spasm) present. No tenderness. Decreased range of motion (pain with rotation to right). Negative right straight leg raise test and negative left straight leg raise test.   Skin:     General: Skin is warm and dry.   Neurological:      Mental Status: She is alert.      Sensory: Sensation is intact.      Motor: Motor function is intact.         Assessment:       1. Left sided abdominal pain    2. Constipation, unspecified constipation type    3. Back pain, unspecified back location, unspecified back pain laterality, unspecified chronicity        Plan:       Natividad was seen today for constipation.    Diagnoses and all orders for this visit:    Left sided abdominal pain  -     CBC Auto Differential; Future  -     Comprehensive Metabolic Panel; Future  -     CT Abdomen Pelvis With Contrast; Future  -     POCT urine pregnancy    Constipation, unspecified constipation type  -     TSH; Future  -     CT Abdomen  "Pelvis With Contrast; Future  -     POCT urine pregnancy    Back pain, unspecified back location, unspecified back pain laterality, unspecified chronicity  -     Urinalysis  -     Urine culture  -     POCT urine pregnancy    >45 minutes spent on patient encounter  Urine preg negative  Will get lab work and CT today along with urine studies    Pt has been given instructions populated from [facility] database and has verbalized understanding of the after visit summary and information contained wherein.    Follow up with a primary care provider. May go to ER for acute shortness of breath, lightheadedness, fever, or any other emergent complaints or changes in condition.    "This note will be shared with the patient"    Future Appointments   Date Time Provider Department Center   4/19/2022  9:45 AM LAB, SAME DAY [facility]TM[facility] LAB IM Marcin Laurent PCW   4/19/2022  4:30 PM [facility] CT2 LIMIT 450 LBS [facility] CT SCAN [facility]                 "

## 2022-04-19 NOTE — PROGRESS NOTES
"Subjective:       Patient ID: Natividad Zaldivar is a 26 y.o. female.        Chief Complaint: Constipation    HPI   Review of Systems    Objective:      Physical Exam    Assessment:       No diagnosis found.    Plan:       There are no diagnoses linked to this encounter.    Pt has been given instructions populated from [employer] database and has verbalized understanding of the after visit summary and information contained wherein.    Follow up with a primary care provider. May go to ER for acute shortness of breath, lightheadedness, fever, or any other emergent complaints or changes in condition.    "This note will be shared with the patient"    No future appointments.              "

## 2022-04-20 ENCOUNTER — OFFICE VISIT (OUTPATIENT)
Dept: SURGERY | Facility: CLINIC | Age: 27
End: 2022-04-20
Payer: COMMERCIAL

## 2022-04-20 ENCOUNTER — TELEPHONE (OUTPATIENT)
Dept: INTERNAL MEDICINE | Facility: CLINIC | Age: 27
End: 2022-04-20
Payer: COMMERCIAL

## 2022-04-20 VITALS
WEIGHT: 196.31 LBS | HEART RATE: 86 BPM | BODY MASS INDEX: 36.12 KG/M2 | SYSTOLIC BLOOD PRESSURE: 141 MMHG | HEIGHT: 62 IN | DIASTOLIC BLOOD PRESSURE: 92 MMHG

## 2022-04-20 DIAGNOSIS — E66.09 CLASS 2 OBESITY DUE TO EXCESS CALORIES WITHOUT SERIOUS COMORBIDITY WITH BODY MASS INDEX (BMI) OF 35.0 TO 35.9 IN ADULT: ICD-10-CM

## 2022-04-20 DIAGNOSIS — K63.89 EPIPLOIC APPENDAGITIS: Primary | ICD-10-CM

## 2022-04-20 LAB — BACTERIA UR CULT: NO GROWTH

## 2022-04-20 PROCEDURE — 3008F BODY MASS INDEX DOCD: CPT | Mod: CPTII,S$GLB,, | Performed by: SURGERY

## 2022-04-20 PROCEDURE — 99204 OFFICE O/P NEW MOD 45 MIN: CPT | Mod: S$GLB,,, | Performed by: SURGERY

## 2022-04-20 PROCEDURE — 99204 PR OFFICE/OUTPT VISIT, NEW, LEVL IV, 45-59 MIN: ICD-10-PCS | Mod: S$GLB,,, | Performed by: SURGERY

## 2022-04-20 PROCEDURE — 1160F PR REVIEW ALL MEDS BY PRESCRIBER/CLIN PHARMACIST DOCUMENTED: ICD-10-PCS | Mod: CPTII,S$GLB,, | Performed by: SURGERY

## 2022-04-20 PROCEDURE — 3008F PR BODY MASS INDEX (BMI) DOCUMENTED: ICD-10-PCS | Mod: CPTII,S$GLB,, | Performed by: SURGERY

## 2022-04-20 PROCEDURE — 99999 PR PBB SHADOW E&M-EST. PATIENT-LVL III: ICD-10-PCS | Mod: PBBFAC,,, | Performed by: SURGERY

## 2022-04-20 PROCEDURE — 1159F PR MEDICATION LIST DOCUMENTED IN MEDICAL RECORD: ICD-10-PCS | Mod: CPTII,S$GLB,, | Performed by: SURGERY

## 2022-04-20 PROCEDURE — 1159F MED LIST DOCD IN RCRD: CPT | Mod: CPTII,S$GLB,, | Performed by: SURGERY

## 2022-04-20 PROCEDURE — 3077F PR MOST RECENT SYSTOLIC BLOOD PRESSURE >= 140 MM HG: ICD-10-PCS | Mod: CPTII,S$GLB,, | Performed by: SURGERY

## 2022-04-20 PROCEDURE — 3080F DIAST BP >= 90 MM HG: CPT | Mod: CPTII,S$GLB,, | Performed by: SURGERY

## 2022-04-20 PROCEDURE — 3077F SYST BP >= 140 MM HG: CPT | Mod: CPTII,S$GLB,, | Performed by: SURGERY

## 2022-04-20 PROCEDURE — 1160F RVW MEDS BY RX/DR IN RCRD: CPT | Mod: CPTII,S$GLB,, | Performed by: SURGERY

## 2022-04-20 PROCEDURE — 99999 PR PBB SHADOW E&M-EST. PATIENT-LVL III: CPT | Mod: PBBFAC,,, | Performed by: SURGERY

## 2022-04-20 PROCEDURE — 3080F PR MOST RECENT DIASTOLIC BLOOD PRESSURE >= 90 MM HG: ICD-10-PCS | Mod: CPTII,S$GLB,, | Performed by: SURGERY

## 2022-04-20 RX ORDER — IBUPROFEN 600 MG/1
600 TABLET ORAL EVERY 6 HOURS
Qty: 28 TABLET | Refills: 0 | Status: SHIPPED | OUTPATIENT
Start: 2022-04-20 | End: 2022-04-27

## 2022-04-20 NOTE — TELEPHONE ENCOUNTER
Discussed CT results with Dr. Mai who rec referral to general surgery to eval and also referral to GI    Called and spoke with patient, discussed referrals and appointments    To see general surgery today at 4 and will see GI next week    CT from 4/19/22  Impression:     1. Findings compatible with epiploic appendagitis adjacent to the distal descending colon.  2. Hepatomegaly and hepatic steatosis.

## 2022-04-21 ENCOUNTER — TELEPHONE (OUTPATIENT)
Dept: INTERNAL MEDICINE | Facility: CLINIC | Age: 27
End: 2022-04-21
Payer: COMMERCIAL

## 2022-04-21 ENCOUNTER — LAB VISIT (OUTPATIENT)
Dept: LAB | Facility: HOSPITAL | Age: 27
End: 2022-04-21
Payer: COMMERCIAL

## 2022-04-21 DIAGNOSIS — R82.81 PYURIA: ICD-10-CM

## 2022-04-21 DIAGNOSIS — R82.81 PYURIA: Primary | ICD-10-CM

## 2022-04-21 PROBLEM — E66.812 CLASS 2 OBESITY DUE TO EXCESS CALORIES WITHOUT SERIOUS COMORBIDITY WITH BODY MASS INDEX (BMI) OF 35.0 TO 35.9 IN ADULT: Status: ACTIVE | Noted: 2022-04-20

## 2022-04-21 PROBLEM — K63.89 EPIPLOIC APPENDAGITIS: Status: ACTIVE | Noted: 2022-04-19

## 2022-04-21 PROBLEM — E66.09 CLASS 2 OBESITY DUE TO EXCESS CALORIES WITHOUT SERIOUS COMORBIDITY WITH BODY MASS INDEX (BMI) OF 35.0 TO 35.9 IN ADULT: Status: ACTIVE | Noted: 2022-04-20

## 2022-04-21 PROBLEM — J01.90 ACUTE SINUSITIS: Status: ACTIVE | Noted: 2022-04-12

## 2022-04-21 LAB
BACTERIA #/AREA URNS AUTO: ABNORMAL /HPF
BILIRUB UR QL STRIP: NEGATIVE
CLARITY UR REFRACT.AUTO: ABNORMAL
COLOR UR AUTO: YELLOW
GLUCOSE UR QL STRIP: NEGATIVE
HGB UR QL STRIP: NEGATIVE
KETONES UR QL STRIP: ABNORMAL
LEUKOCYTE ESTERASE UR QL STRIP: ABNORMAL
MICROSCOPIC COMMENT: ABNORMAL
NITRITE UR QL STRIP: NEGATIVE
PH UR STRIP: 6 [PH] (ref 5–8)
PROT UR QL STRIP: NEGATIVE
RBC #/AREA URNS AUTO: 0 /HPF (ref 0–4)
SP GR UR STRIP: 1.01 (ref 1–1.03)
SQUAMOUS #/AREA URNS AUTO: 8 /HPF
URN SPEC COLLECT METH UR: ABNORMAL
WBC #/AREA URNS AUTO: 12 /HPF (ref 0–5)
YEAST UR QL AUTO: ABNORMAL

## 2022-04-21 PROCEDURE — 81001 URINALYSIS AUTO W/SCOPE: CPT | Performed by: PHYSICIAN ASSISTANT

## 2022-04-21 PROCEDURE — 87086 URINE CULTURE/COLONY COUNT: CPT | Performed by: PHYSICIAN ASSISTANT

## 2022-04-21 NOTE — TELEPHONE ENCOUNTER
Called the pt to discuss lab results and to request another Urine Sample. I left the pt a Vm asking for a call back.

## 2022-04-21 NOTE — PROGRESS NOTES
History & Physical    SUBJECTIVE:     History of Present Illness:  Ms. Zaldivar is a 26 year-old obese woman with no other significant medical history who presents with left-sided abdominal pain x1 week and associated constipation that has just started improving over the last 2 days. She had a low-grade fever on 4/12 with sinus congestion at which time she was diagnosed with sinusitis and prescribed a z-fatou. She has since not had any recurrent fevers and denies chills, nausea, vomiting, hematochezia, melena, or dysuria. She was seen by her PCP yesterday who obtained a CT abd/pelvis which demonstrated epiploic appendagitis of her distal descending colon without associated abscess or obstruction; CBC/CMP were unremarkable (work-up personally reviewed).    Chief Complaint   Patient presents with    Abdominal Pain     left     Review of patient's allergies indicates:  No Known Allergies    Current Outpatient Medications   Medication Sig Dispense Refill    ascorbic acid, vitamin C, (VITAMIN C) 1000 MG tablet Take 1,000 mg by mouth once daily.      drospirenone-ethinyl estradioL (KHADIJAH) 3-0.02 mg per tablet Take 1 tablet by mouth once daily. 84 tablet 3            No current facility-administered medications for this visit.     Past Medical History:   Diagnosis Date    Acne     Class 2 obesity in adult     Hepatic steatosis 04/19/2022    Menorrhagia      Past Surgical History:   Procedure Laterality Date    WISDOM TOOTH EXTRACTION  07/2015     Family History   Problem Relation Age of Onset    Cholelithiasis Mother     Diabetes Mother     Diabetes Father     Esophageal cancer Father     Celiac disease Neg Hx     Cirrhosis Neg Hx     Colon cancer Neg Hx     Colon polyps Neg Hx     Crohn's disease Neg Hx     Cystic fibrosis Neg Hx     Hemochromatosis Neg Hx     Inflammatory bowel disease Neg Hx     Irritable bowel syndrome Neg Hx     Liver cancer Neg Hx     Liver disease Neg Hx     Rectal cancer Neg Hx   "   Stomach cancer Neg Hx     Ulcerative colitis Neg Hx     Neville's disease Neg Hx     Breast cancer Neg Hx     Cancer Neg Hx     Ovarian cancer Neg Hx      Social History     Tobacco Use    Smoking status: Never Smoker    Smokeless tobacco: Never Used   Substance Use Topics    Alcohol use: Yes     Comment: Social    Drug use: No      Review of Systems:  Review of Systems   Constitutional: Negative for chills, diaphoresis and fever.   HENT: Negative.    Eyes: Negative.    Respiratory: Negative.    Cardiovascular: Negative.    Gastrointestinal: Positive for abdominal pain, constipation and diarrhea. Negative for abdominal distention, blood in stool, nausea and vomiting.   Endocrine: Negative.    Genitourinary: Negative for difficulty urinating, dysuria, frequency and hematuria.   Musculoskeletal: Negative.    Skin: Negative.    Allergic/Immunologic: Negative.    Neurological: Negative.    Hematological: Negative.    Psychiatric/Behavioral: Negative.      OBJECTIVE:     Vital Signs (Most Recent)  Pulse: 86 (04/20/22 1551)  BP: (!) 141/92 (white coat) (04/20/22 1551)  5' 2" (1.575 m)  89 kg (196 lb 5.1 oz)     Physical Exam:  Physical Exam  Vitals reviewed.   Constitutional:       General: She is not in acute distress.     Appearance: Normal appearance. She is well-developed. She is not ill-appearing.   HENT:      Head: Normocephalic and atraumatic.   Eyes:      General: No scleral icterus.     Conjunctiva/sclera: Conjunctivae normal.   Cardiovascular:      Rate and Rhythm: Normal rate and regular rhythm.      Heart sounds: Normal heart sounds.   Pulmonary:      Effort: Pulmonary effort is normal. No respiratory distress.      Breath sounds: Normal breath sounds.   Abdominal:      General: There is no distension.      Palpations: Abdomen is soft.      Tenderness: There is abdominal tenderness (on left). There is no guarding or rebound.   Musculoskeletal:         General: Normal range of motion.      Cervical " back: Normal range of motion and neck supple.   Skin:     General: Skin is warm and dry.      Coloration: Skin is not jaundiced.   Neurological:      General: No focal deficit present.      Mental Status: She is alert and oriented to person, place, and time.   Psychiatric:         Mood and Affect: Mood normal.         Behavior: Behavior normal.       Laboratory  CBC: Reviewed  CMP: Reviewed  Urinalysis: Reviewed    Diagnostic Results:  CT: Reviewed    ASSESSMENT/PLAN:   Patient is a 26 year-old woman with uncomplicated appendigitis epiploicae. Her pain is uncomfortable but not severe. Her constipation is like reactive due to inflammation of her descending colon and is starting to improve. We will trial 1-week of conservative management with round-the-clock ibuprofen 600mg Q6hr which she has been instructed to take with food and not on an empty stomach. She will follow-up in 1-week and if her pain is not improving we will schedule her for diagnostic laparoscopy for removal of the inflamed epiploic appendage. All questions were answered to her satisfaction and she is in agreement with this plan. It was a pleasure meeting Ms. Cashraven and thank you for this consultation.    Mame Wood  4/20/22

## 2022-04-21 NOTE — MEDICAL/APP STUDENT
"  Patient name: Natividad Zaldivar  MRN: [id number]  Attending Physician:    Team: Networked reference to record PCT     PRESENTING HISTORY     Principal Problem: Constipation    Chief Complaint:  No chief complaint on file.      History of Present Illness:   Ms. Natividad Zaldivar is a 26 year old female with no significant past medical history who presents with constipation and low back pain for the past week. Went to urgent care yesterday due to persistent symptoms. CT abdo/pelvis obtained. Patient reports that constipation started Wednesday 04/13. She took laxatives on Friday and Saturday, which helped with her bowel movement. Also reports having a bowel movement yesterday and this morning. Reports diarrhoea and yellow-brown coloured stool. Denies any blood in stool. Patient reports that low back pain started on Sunday and has been a constant "sore" feeling. Reports pain as 5-6/10. Patient states that she experienced sudden onset of "stabbing" pain in her left flank, 7/10 pain. Pain does not radiated and was partially relieved with advil. Denies any pain with defecation, melena, dysuria. Denies fever and chills.     Review of Systems   Constitutional: Negative for chills, fever and weight loss.   HENT: Negative.    Eyes: Negative.    Respiratory: Negative.    Cardiovascular: Negative.    Gastrointestinal: Positive for abdominal pain, constipation and diarrhea. Negative for blood in stool, heartburn, melena, nausea and vomiting.   Genitourinary: Positive for flank pain. Negative for dysuria and hematuria.   Skin: Negative.    Neurological: Negative.        PAST HISTORY:     Past Medical History:   Diagnosis Date    Acne     Menorrhagia        Past Surgical History:   Procedure Laterality Date    WISDOM TOOTH EXTRACTION  07/2015       Family History   Problem Relation Age of Onset    Cholelithiasis Mother     Diabetes Mother     Diabetes Father     Esophageal cancer Father     Celiac disease Neg Hx     Cirrhosis Neg Hx  "    Colon cancer Neg Hx     Colon polyps Neg Hx     Crohn's disease Neg Hx     Cystic fibrosis Neg Hx     Hemochromatosis Neg Hx     Inflammatory bowel disease Neg Hx     Irritable bowel syndrome Neg Hx     Liver cancer Neg Hx     Liver disease Neg Hx     Rectal cancer Neg Hx     Stomach cancer Neg Hx     Ulcerative colitis Neg Hx     Neville's disease Neg Hx     Breast cancer Neg Hx     Cancer Neg Hx     Ovarian cancer Neg Hx        Social History     Socioeconomic History    Marital status: Single   Tobacco Use    Smoking status: Never Smoker    Smokeless tobacco: Never Used   Substance and Sexual Activity    Alcohol use: Yes     Comment: Social    Drug use: No    Sexual activity: Not Currently     Partners: Male     Birth control/protection: OCP     Comment: Single:  Not in a relationship       MEDICATIONS & ALLERGIES:     Current Outpatient Medications on File Prior to Visit   Medication Sig Dispense Refill    ascorbic acid, vitamin C, (VITAMIN C) 1000 MG tablet Take 1,000 mg by mouth once daily.      drospirenone-ethinyl estradioL (KHADIJAH) 3-0.02 mg per tablet Take 1 tablet by mouth once daily. 84 tablet 3     No current facility-administered medications on file prior to visit.        Review of patient's allergies indicates:  No Known Allergies    OBJECTIVE:     Vital Signs:  Body mass index is 35.91 kg/m².     Physical Exam: Physical Exam  Constitutional:       General: She is not in acute distress.     Appearance: Normal appearance.   Pulmonary:      Effort: Pulmonary effort is normal.   Abdominal:      General: There is no distension.      Palpations: Abdomen is soft.      Comments: Tender to palpation on left flank.       Laboratory  Lab Results   Component Value Date    WBC 9.54 04/19/2022    HGB 13.8 04/19/2022    HCT 42.0 04/19/2022    MCV 91 04/19/2022     04/19/2022     No results found for: INR, PROTIME  No results found for: HGBA1C  No results for input(s): POCTGLUCOSE in  the last 72 hours.       ASSESSMENT & PLAN:   Summary:  Ms. Natividad Zaldivar is a 26 year old female with no past medical history who presents with constipation and low back pain for the past week. Went to urgent care yesterday due to persisting symptoms and CT abdo/pelvis was obtained. Findings compatible with epiploic appendagitis adjacent to the distal descending colon.     Plan  - 600mg Ibuprofen q6h - 7 days  - Follow-up visit in 1 week if symptoms persist      MSYessica  Yoko Black

## 2022-04-21 NOTE — TELEPHONE ENCOUNTER
----- Message from Rosalva Saenz PA-C sent at 4/21/2022  6:55 AM CDT -----  Please call patient to do another urine sample

## 2022-04-21 NOTE — PROGRESS NOTES
"HISTORY & PHYSICAL  Hospital Medicine    Patient name: Natividad Zaldivar  MRN: [id number]  Attending Physician:    Team: Networked reference to record PCT     PRESENTING HISTORY     Principal Problem: Constipation    Chief Complaint:  No chief complaint on file.      History of Present Illness:   Ms. Natividad Zaldivar is a 26 year old female with no past medical history who presents with constipation and low back pain for the past week. Went to urgent care yesterday due to persistent symptoms. Patient reports that constipation started Wednesday 04/13. She took laxatives on Friday and Saturday, which helped with her bowel movement. Also reports having a bowel movement yesterday and this morning. Reports diarrhoea and yellow-brown coloured stool. Denies any blood in stool. Patient reports that low back pain started on Sunday and has been a constant "sore" feeling. Reports pain as 5-6/10. Patient states that she experienced sudden onset of "stabbing" pain in her left flank, 7/10 pain. Pain does not radiated and was partially relieved with advil. Denies any pain with defecation, melena, dysuria. Denies fever and chills.     Review of Systems   Constitutional: Negative for chills, fever and weight loss.   HENT: Negative.    Eyes: Negative.    Respiratory: Negative.    Cardiovascular: Negative.    Gastrointestinal: Positive for abdominal pain, constipation and diarrhea. Negative for blood in stool, heartburn, melena, nausea and vomiting.   Genitourinary: Positive for flank pain. Negative for dysuria and hematuria.   Skin: Negative.    Neurological: Negative.            PAST HISTORY:     Past Medical History:   Diagnosis Date    Acne     Menorrhagia        Past Surgical History:   Procedure Laterality Date    WISDOM TOOTH EXTRACTION  07/2015       Family History   Problem Relation Age of Onset    Cholelithiasis Mother     Diabetes Mother     Diabetes Father     Esophageal cancer Father     Celiac disease Neg Hx     Cirrhosis " Neg Hx     Colon cancer Neg Hx     Colon polyps Neg Hx     Crohn's disease Neg Hx     Cystic fibrosis Neg Hx     Hemochromatosis Neg Hx     Inflammatory bowel disease Neg Hx     Irritable bowel syndrome Neg Hx     Liver cancer Neg Hx     Liver disease Neg Hx     Rectal cancer Neg Hx     Stomach cancer Neg Hx     Ulcerative colitis Neg Hx     Neville's disease Neg Hx     Breast cancer Neg Hx     Cancer Neg Hx     Ovarian cancer Neg Hx        Social History     Socioeconomic History    Marital status: Single   Tobacco Use    Smoking status: Never Smoker    Smokeless tobacco: Never Used   Substance and Sexual Activity    Alcohol use: Yes     Comment: Social    Drug use: No    Sexual activity: Not Currently     Partners: Male     Birth control/protection: OCP     Comment: Single:  Not in a relationship       MEDICATIONS & ALLERGIES:     Current Outpatient Medications on File Prior to Visit   Medication Sig Dispense Refill    ascorbic acid, vitamin C, (VITAMIN C) 1000 MG tablet Take 1,000 mg by mouth once daily.      drospirenone-ethinyl estradioL (KHADIJAH) 3-0.02 mg per tablet Take 1 tablet by mouth once daily. 84 tablet 3     No current facility-administered medications on file prior to visit.        Review of patient's allergies indicates:  No Known Allergies    OBJECTIVE:     Vital Signs:  Body mass index is 35.91 kg/m².     Physical Exam: Physical Exam  Constitutional:       General: She is not in acute distress.     Appearance: Normal appearance.   Pulmonary:      Effort: Pulmonary effort is normal.   Abdominal:      General: There is no distension.      Palpations: Abdomen is soft.      Comments: Tender to palpation on left flank.       Laboratory  Lab Results   Component Value Date    WBC 9.54 04/19/2022    HGB 13.8 04/19/2022    HCT 42.0 04/19/2022    MCV 91 04/19/2022     04/19/2022     No results found for: INR, PROTIME  No results found for: HGBA1C  No results for input(s):  POCTGLUCOSE in the last 72 hours.           ASSESSMENT & PLAN:   Summary:  Ms. Natividad Zaldivar is a 26 year old female with no past medical history who presents with constipation and low back pain for the past week. Went to urgent care yesterday due to persisting symptoms and CT abdo/pelvis was obtained. Findings compatible with epiploic appendagitis adjacent to the distal descending colon.     Plan  - 600mg Ibuprofen q6h - 7 days  - Follow-up visit in 1 week if symptoms persist      PURA  Yoko Black

## 2022-04-21 NOTE — TELEPHONE ENCOUNTER
----- Message from Dali Helton sent at 4/21/2022  1:29 PM CDT -----  Contact: Self/[phone]  Patient is returning a phone call.  Who left a message for the patient: Homa  Does patient know what this is regarding:  results   Would you like a call back, or a response through your [facility] portal?:   call back   Comments:

## 2022-04-21 NOTE — TELEPHONE ENCOUNTER
Returned the pt phone garima;l and discussed the need for another urine sample. The pt expressed understanding. She will come in as soon as possible to turn in a new sample.

## 2022-04-23 LAB
BACTERIA UR CULT: NORMAL
BACTERIA UR CULT: NORMAL

## 2022-04-26 ENCOUNTER — TELEPHONE (OUTPATIENT)
Dept: INTERNAL MEDICINE | Facility: CLINIC | Age: 27
End: 2022-04-26
Payer: COMMERCIAL

## 2022-04-26 NOTE — TELEPHONE ENCOUNTER
----- Message from Rosalva Saenz PA-C sent at 4/26/2022  6:03 AM CDT -----  Please call patient  Urine again shows multiple organisms  Pleas see if she has developed any urinary sx like burning, urgency, frequency and let me know

## 2022-04-27 NOTE — TELEPHONE ENCOUNTER
Okay, let's have her schedule a follow up with me  I want to discuss CT finding of fatty liver and monitoring for this as well as urine

## 2022-05-02 ENCOUNTER — OFFICE VISIT (OUTPATIENT)
Dept: INTERNAL MEDICINE | Facility: CLINIC | Age: 27
End: 2022-05-02
Payer: COMMERCIAL

## 2022-05-02 VITALS
BODY MASS INDEX: 35.49 KG/M2 | HEIGHT: 62 IN | OXYGEN SATURATION: 99 % | HEART RATE: 64 BPM | DIASTOLIC BLOOD PRESSURE: 80 MMHG | WEIGHT: 192.88 LBS | SYSTOLIC BLOOD PRESSURE: 124 MMHG

## 2022-05-02 DIAGNOSIS — K76.0 FATTY LIVER: Primary | ICD-10-CM

## 2022-05-02 DIAGNOSIS — K63.89 EPIPLOIC APPENDAGITIS: ICD-10-CM

## 2022-05-02 DIAGNOSIS — E66.09 CLASS 2 OBESITY DUE TO EXCESS CALORIES WITHOUT SERIOUS COMORBIDITY WITH BODY MASS INDEX (BMI) OF 35.0 TO 35.9 IN ADULT: ICD-10-CM

## 2022-05-02 PROBLEM — J01.90 ACUTE SINUSITIS: Status: RESOLVED | Noted: 2022-04-12 | Resolved: 2022-05-02

## 2022-05-02 PROCEDURE — 1160F RVW MEDS BY RX/DR IN RCRD: CPT | Mod: CPTII,S$GLB,, | Performed by: PHYSICIAN ASSISTANT

## 2022-05-02 PROCEDURE — 3079F DIAST BP 80-89 MM HG: CPT | Mod: CPTII,S$GLB,, | Performed by: PHYSICIAN ASSISTANT

## 2022-05-02 PROCEDURE — 1160F PR REVIEW ALL MEDS BY PRESCRIBER/CLIN PHARMACIST DOCUMENTED: ICD-10-PCS | Mod: CPTII,S$GLB,, | Performed by: PHYSICIAN ASSISTANT

## 2022-05-02 PROCEDURE — 3074F SYST BP LT 130 MM HG: CPT | Mod: CPTII,S$GLB,, | Performed by: PHYSICIAN ASSISTANT

## 2022-05-02 PROCEDURE — 99214 OFFICE O/P EST MOD 30 MIN: CPT | Mod: S$GLB,,, | Performed by: PHYSICIAN ASSISTANT

## 2022-05-02 PROCEDURE — 99999 PR PBB SHADOW E&M-EST. PATIENT-LVL IV: CPT | Mod: PBBFAC,,, | Performed by: PHYSICIAN ASSISTANT

## 2022-05-02 PROCEDURE — 99214 PR OFFICE/OUTPT VISIT, EST, LEVL IV, 30-39 MIN: ICD-10-PCS | Mod: S$GLB,,, | Performed by: PHYSICIAN ASSISTANT

## 2022-05-02 PROCEDURE — 3079F PR MOST RECENT DIASTOLIC BLOOD PRESSURE 80-89 MM HG: ICD-10-PCS | Mod: CPTII,S$GLB,, | Performed by: PHYSICIAN ASSISTANT

## 2022-05-02 PROCEDURE — 99999 PR PBB SHADOW E&M-EST. PATIENT-LVL IV: ICD-10-PCS | Mod: PBBFAC,,, | Performed by: PHYSICIAN ASSISTANT

## 2022-05-02 PROCEDURE — 3008F PR BODY MASS INDEX (BMI) DOCUMENTED: ICD-10-PCS | Mod: CPTII,S$GLB,, | Performed by: PHYSICIAN ASSISTANT

## 2022-05-02 PROCEDURE — 1159F PR MEDICATION LIST DOCUMENTED IN MEDICAL RECORD: ICD-10-PCS | Mod: CPTII,S$GLB,, | Performed by: PHYSICIAN ASSISTANT

## 2022-05-02 PROCEDURE — 3074F PR MOST RECENT SYSTOLIC BLOOD PRESSURE < 130 MM HG: ICD-10-PCS | Mod: CPTII,S$GLB,, | Performed by: PHYSICIAN ASSISTANT

## 2022-05-02 PROCEDURE — 1159F MED LIST DOCD IN RCRD: CPT | Mod: CPTII,S$GLB,, | Performed by: PHYSICIAN ASSISTANT

## 2022-05-02 PROCEDURE — 3008F BODY MASS INDEX DOCD: CPT | Mod: CPTII,S$GLB,, | Performed by: PHYSICIAN ASSISTANT

## 2022-05-02 NOTE — PROGRESS NOTES
Subjective:       Patient ID: Natividad Zaldivar is a 26 y.o. female.        Chief Complaint: Follow-up    Natividad Zaldivar is an established patient of [facility] here today for follow up visit.    Fatty liver, hepatomegaly - seen on CT 4/2022, normal liver enzymes, BMI is 35, she thinks both parents have fatty liver as well    BMI 35 - realizes she needs to lose some weight, discussed  Wt Readings from Last 4 Encounters:  05/02/22 : 87.5 kg (192 lb 14.4 oz)  04/20/22 : 89 kg (196 lb 5.1 oz)  04/19/22 : 88.3 kg (194 lb 8.9 oz)  12/29/21 : 88.5 kg (195 lb)    Epiploic appendagitis - saw general surgery, tx with ibuprofen and all sx have resolved, bowels now moving normally     She had pyuria but no urinary sx         Review of Systems   Constitutional: Negative for chills, diaphoresis, fatigue and fever.   HENT: Negative for congestion and sore throat.    Eyes: Negative for visual disturbance.   Respiratory: Negative for cough, chest tightness and shortness of breath.    Cardiovascular: Negative for chest pain, palpitations and leg swelling.   Gastrointestinal: Negative for abdominal pain, blood in stool, constipation, diarrhea, nausea and vomiting.   Genitourinary: Negative for dysuria, frequency, hematuria and urgency.   Musculoskeletal: Negative for arthralgias and back pain.   Skin: Negative for rash.   Neurological: Negative for dizziness, syncope, weakness and headaches.   Psychiatric/Behavioral: Negative for dysphoric mood and sleep disturbance. The patient is not nervous/anxious.        Objective:      Physical Exam  Vitals and nursing note reviewed.   Constitutional:       Appearance: Normal appearance. She is well-developed.   HENT:      Head: Normocephalic.      Right Ear: External ear normal.      Left Ear: External ear normal.   Eyes:      Pupils: Pupils are equal, round, and reactive to light.   Cardiovascular:      Rate and Rhythm: Normal rate and regular rhythm.      Heart sounds: Normal heart sounds.  "No murmur heard.    No friction rub. No gallop.   Pulmonary:      Effort: Pulmonary effort is normal. No respiratory distress.      Breath sounds: Normal breath sounds.   Abdominal:      Palpations: Abdomen is soft.      Tenderness: There is no abdominal tenderness.   Skin:     General: Skin is warm and dry.   Neurological:      Mental Status: She is alert.         Assessment:       1. Fatty liver    2. Class 2 obesity with body mass index (BMI) of 35.0 to 35.9 in adult    3. Epiploic appendagitis - seen on CT 4/2022        Plan:       Natividad was seen today for follow-up.    Diagnoses and all orders for this visit:    Fatty liver - importance of weight loss, exercise, and healthy diet reviewed at length  -     Ambulatory referral/consult to Hepatology; Future    Class 2 obesity with body mass index (BMI) of 35.0 to 35.9 in adult - importance of weight loss reviewed    Epiploic appendagitis - seen on CT 4/2022 - all sx resolved    >30 minutes spent on patient encounter today    Pt has been given instructions populated from [place] database and has verbalized understanding of the after visit summary and information contained wherein.    Follow up with a primary care provider. May go to ER for acute shortness of breath, lightheadedness, fever, or any other emergent complaints or changes in condition.    "This note will be shared with the patient"    Future Appointments   Date Time Provider Department Center   5/9/2022  8:00 AM Ananya Cesar NP [facility] HEPAT [facility]   6/7/2022 10:00 AM Shaye Rogers MD [facility] GASTRO [facility]                 "

## 2022-05-09 ENCOUNTER — OFFICE VISIT (OUTPATIENT)
Dept: HEPATOLOGY | Facility: CLINIC | Age: 27
End: 2022-05-09
Payer: COMMERCIAL

## 2022-05-09 ENCOUNTER — LAB VISIT (OUTPATIENT)
Dept: LAB | Facility: HOSPITAL | Age: 27
End: 2022-05-09
Payer: COMMERCIAL

## 2022-05-09 VITALS
RESPIRATION RATE: 18 BRPM | HEIGHT: 62 IN | TEMPERATURE: 99 F | DIASTOLIC BLOOD PRESSURE: 80 MMHG | HEART RATE: 76 BPM | WEIGHT: 193.56 LBS | SYSTOLIC BLOOD PRESSURE: 134 MMHG | BODY MASS INDEX: 35.62 KG/M2 | OXYGEN SATURATION: 97 %

## 2022-05-09 DIAGNOSIS — E66.09 CLASS 2 OBESITY DUE TO EXCESS CALORIES WITHOUT SERIOUS COMORBIDITY WITH BODY MASS INDEX (BMI) OF 35.0 TO 35.9 IN ADULT: ICD-10-CM

## 2022-05-09 DIAGNOSIS — K76.0 FATTY LIVER: Primary | ICD-10-CM

## 2022-05-09 DIAGNOSIS — K76.0 FATTY LIVER: ICD-10-CM

## 2022-05-09 LAB
CHOLEST SERPL-MCNC: 225 MG/DL (ref 120–199)
CHOLEST/HDLC SERPL: 3.6 {RATIO} (ref 2–5)
ESTIMATED AVG GLUCOSE: 97 MG/DL (ref 68–131)
HBA1C MFR BLD: 5 % (ref 4–5.6)
HDLC SERPL-MCNC: 63 MG/DL (ref 40–75)
HDLC SERPL: 28 % (ref 20–50)
LDLC SERPL CALC-MCNC: 131.4 MG/DL (ref 63–159)
NONHDLC SERPL-MCNC: 162 MG/DL
TRIGL SERPL-MCNC: 153 MG/DL (ref 30–150)

## 2022-05-09 PROCEDURE — 87340 HEPATITIS B SURFACE AG IA: CPT | Performed by: NURSE PRACTITIONER

## 2022-05-09 PROCEDURE — 3044F PR MOST RECENT HEMOGLOBIN A1C LEVEL <7.0%: ICD-10-PCS | Mod: CPTII,S$GLB,, | Performed by: NURSE PRACTITIONER

## 2022-05-09 PROCEDURE — 1159F PR MEDICATION LIST DOCUMENTED IN MEDICAL RECORD: ICD-10-PCS | Mod: CPTII,S$GLB,, | Performed by: NURSE PRACTITIONER

## 2022-05-09 PROCEDURE — 3008F BODY MASS INDEX DOCD: CPT | Mod: CPTII,S$GLB,, | Performed by: NURSE PRACTITIONER

## 2022-05-09 PROCEDURE — 86706 HEP B SURFACE ANTIBODY: CPT | Performed by: NURSE PRACTITIONER

## 2022-05-09 PROCEDURE — 86803 HEPATITIS C AB TEST: CPT | Performed by: NURSE PRACTITIONER

## 2022-05-09 PROCEDURE — 3075F PR MOST RECENT SYSTOLIC BLOOD PRESS GE 130-139MM HG: ICD-10-PCS | Mod: CPTII,S$GLB,, | Performed by: NURSE PRACTITIONER

## 2022-05-09 PROCEDURE — 3044F HG A1C LEVEL LT 7.0%: CPT | Mod: CPTII,S$GLB,, | Performed by: NURSE PRACTITIONER

## 2022-05-09 PROCEDURE — 3075F SYST BP GE 130 - 139MM HG: CPT | Mod: CPTII,S$GLB,, | Performed by: NURSE PRACTITIONER

## 2022-05-09 PROCEDURE — 86790 VIRUS ANTIBODY NOS: CPT | Performed by: NURSE PRACTITIONER

## 2022-05-09 PROCEDURE — 80061 LIPID PANEL: CPT | Performed by: NURSE PRACTITIONER

## 2022-05-09 PROCEDURE — 99999 PR PBB SHADOW E&M-EST. PATIENT-LVL V: CPT | Mod: PBBFAC,,, | Performed by: NURSE PRACTITIONER

## 2022-05-09 PROCEDURE — 3008F PR BODY MASS INDEX (BMI) DOCUMENTED: ICD-10-PCS | Mod: CPTII,S$GLB,, | Performed by: NURSE PRACTITIONER

## 2022-05-09 PROCEDURE — 1160F PR REVIEW ALL MEDS BY PRESCRIBER/CLIN PHARMACIST DOCUMENTED: ICD-10-PCS | Mod: CPTII,S$GLB,, | Performed by: NURSE PRACTITIONER

## 2022-05-09 PROCEDURE — 1160F RVW MEDS BY RX/DR IN RCRD: CPT | Mod: CPTII,S$GLB,, | Performed by: NURSE PRACTITIONER

## 2022-05-09 PROCEDURE — 99203 OFFICE O/P NEW LOW 30 MIN: CPT | Mod: S$GLB,,, | Performed by: NURSE PRACTITIONER

## 2022-05-09 PROCEDURE — 86704 HEP B CORE ANTIBODY TOTAL: CPT | Performed by: NURSE PRACTITIONER

## 2022-05-09 PROCEDURE — 99203 PR OFFICE/OUTPT VISIT, NEW, LEVL III, 30-44 MIN: ICD-10-PCS | Mod: S$GLB,,, | Performed by: NURSE PRACTITIONER

## 2022-05-09 PROCEDURE — 3079F DIAST BP 80-89 MM HG: CPT | Mod: CPTII,S$GLB,, | Performed by: NURSE PRACTITIONER

## 2022-05-09 PROCEDURE — 3079F PR MOST RECENT DIASTOLIC BLOOD PRESSURE 80-89 MM HG: ICD-10-PCS | Mod: CPTII,S$GLB,, | Performed by: NURSE PRACTITIONER

## 2022-05-09 PROCEDURE — 1159F MED LIST DOCD IN RCRD: CPT | Mod: CPTII,S$GLB,, | Performed by: NURSE PRACTITIONER

## 2022-05-09 PROCEDURE — 83036 HEMOGLOBIN GLYCOSYLATED A1C: CPT | Performed by: NURSE PRACTITIONER

## 2022-05-09 PROCEDURE — 99999 PR PBB SHADOW E&M-EST. PATIENT-LVL V: ICD-10-PCS | Mod: PBBFAC,,, | Performed by: NURSE PRACTITIONER

## 2022-05-09 PROCEDURE — 36415 COLL VENOUS BLD VENIPUNCTURE: CPT | Performed by: NURSE PRACTITIONER

## 2022-05-09 NOTE — PROGRESS NOTES
[facility] VISIT NEW PT NOTE    REFERRING PROVIDER:  Rosalva Saenz  PCP: [facility]     CHIEF COMPLAINT: fatty liver     HPI: This is a 26 y.o. White female with PMH noted below, presenting for evaluation of    fatty liver disease     No Previous serologic w/u - will screen for Hep B and C    Prior serologic workup:   No results found for: SMOOTHMUSCAB, AMAIFA, IGGSERUM, ANASCREEN, FERRITIN, FESATURATED, PETH, [id number], CERULOPLSM, HEPBSAG, HEPCAB, HEPAIGM      Risk factors for fatty liver include intermittent alcohol use, obesity. No DM or lipid screening, will obtain     Liver fibrosis staging:  -- fibroscan with RTC    Interval HPI: Presents today alone. No current dietary restrictions    Labs done 4/2022 show normal transaminase levels x2 in 2014 and 2022  Platelets WNL, alk phos WNL  Synthetic liver functioning WNL    Lab Results   Component Value Date    ALT 22 04/19/2022    AST 16 04/19/2022    ALKPHOS 58 04/19/2022    BILITOT 0.4 04/19/2022    ALBUMIN 3.9 04/19/2022     04/19/2022       Abd CT done 4/2022 showed hepatomegaly, fatty liver, focal fatty sparing     Denies family history of liver disease . Intermittent  Alcohol consumption, see below  Social History     Substance and Sexual Activity   Alcohol Use Yes    Comment: 3-4 servings per month, no daily or heavy alcohol use       Immunity to Hep A and B - will check with next labs          Allergy and medication list reviewed and updated     PMHX:  has a past medical history of Acne, Class 2 obesity in adult, Hepatic steatosis (04/19/2022), and Menorrhagia.    PSHX:  has a past surgical history that includes [place] tooth extraction (07/2015).    FAMILY HISTORY: Updated and [facility] in [facility]    SOCIAL HISTORY:   Social History     Substance and Sexual Activity   Alcohol Use Yes    Comment: 3-4 servings per month, no daily or heavy alcohol use       Social History     Substance and Sexual Activity   Drug Use No       ROS:  "  GENERAL: Denies fatigue  CARDIOVASCULAR: Denies edema  GI: Denies abdominal pain  SKIN: Denies rash, itching   NEURO: Denies confusion, memory loss, or mood changes    PHYSICAL EXAM:   Friendly White female, in no acute distress; alert and oriented to person, place and time  VITALS: /80 (BP Location: Right arm, Patient Position: Sitting, BP Method: Medium (Automatic))   Pulse 76   Temp 98.5 °F (36.9 °C) (Oral)   Resp 18   Ht 5' 2" (1.575 m)   Wt 87.8 kg (193 lb 9 oz)   SpO2 97%   BMI 35.40 kg/m²   EYES: Sclerae anicteric  GI: Soft, non-tender, non-distended. No ascites.  EXTREMITIES:  No edema.  SKIN: Warm and dry. No jaundice. No telangectasias noted. No palmar erythema.  NEURO:  No asterixis.  PSYCH:  Thought and speech pattern appropriate. Behavior normal      EDUCATION:  See instructions discussed with patient in Instructions section of the After Visit Summary     ASSESSMENT & PLAN:  26 y.o. White female with:  1.  Fatty liver, likely related to metabolic risk factors   - see HPI  -- Immunity to Hep A and B : Will check immunity markers for HBV/HAV  and arrange for vaccination if needed  -- Fibroscan with RTC  -- Recommendations discussed with patient:  1. Limit alcohol consumption, no more than 1 serving(s) of alcohol in any day (1 serving is 5 ounces of wine, 12 ounces of beer, or 1.5 ounces of liquor) and do NOT recommend any daily alcohol use    2 Weight loss goal of 20 lbs  3. Low carb/sugar, high fiber and protein diet, limit your carb intake to LESS than 30-45 grams of carbs with a meal or LESS than 5-10 grams with any snack   4. Exercise, 5 days per week, 30 minutes per day, as tolerated  5. Recommend good cholesterol, blood pressure, blood sugar levels   6. Consider enrolling in [facility]ONG fibrosis clinical trails - will determine based on liver fibrosis staging results     2. Elevated liver enzymes  -- will complete full sero w/u   -- labs today  Orders Placed This Encounter   Procedures    " FibroScan (Vibration Controlled Transient Elastography)    Hepatitis A antibody, IgG    Hepatitis B Core Antibody, Total    Hepatitis B Surface Ab, Qualitative    Hepatitis B Surface Antigen    Hepatitis C Antibody    Hemoglobin A1C    Lipid Panel        3. Obesity  -- will screen lipids and A1c  -- Body mass index is 35.4 kg/m².   -- increases risk for fatty liver          Labs today, then   Follow up in about 1 week (around 5/16/2022). with fibroscan same day before  Orders Placed This Encounter   Procedures    FibroScan (Vibration Controlled Transient Elastography)    Hepatitis A antibody, IgG    Hepatitis B Core Antibody, Total    Hepatitis B Surface Ab, Qualitative    Hepatitis B Surface Antigen    Hepatitis C Antibody    Hemoglobin A1C    Lipid Panel        Thank you for allowing me to participate in the care of Natividad CastrejonMARIAA Casiano    I spent a total of 30 minutes on the day of the visit.This includes face to face time and non-face to face time preparing to see the patient (eg, review of tests), obtaining and/or reviewing separately obtained history, documenting clinical information in the electronic or other health record, independently interpreting results and communicating results to the patient/family/caregiver, and coordinating care.         CC'ed note to:   Rosalva Saenz PAVeritoC  [facility]

## 2022-05-09 NOTE — PATIENT INSTRUCTIONS
1. Fibroscan to look for fat or scar tissue in the liver with return to clinic   2. Will check immunity markers for Hepatitis A and B and arrange for vaccination if needed  3. Labs today to  check for multiple causes of liver disease. These labs will release to you as soon as they are resulted but we will discuss them in detail at your upcoming visit to discuss what the lab results mean.   4.  Follow up in 1 week with fibroscan same day     There is no FDA approved therapy for fatty liver disease. Therefore, these things are important:  Limit alcohol consumption  2 Weight loss goal of 15-20 lbs, referral for [facility] if interested. Also, if interested in a dietician visit to create a weight loss plan, contact the dietician team at [facility] at [email or web address] to schedule a visit to you can call [facility] in [place]: [phone] and the  will transfer the call to one of the dieticians to schedule an appointment. Or you can also call [phone] to schedule. They do offer video visits   3. Low carb/sugar, high fiber and protein diet.Try to limit your carb intake to LESS than 30-45 grams of carbs with a meal or LESS than 5-10 grams with any snack (total of any snack foods eaten during that time). Use [employer] Pal stephanie to add up your carbs through the day. Do NOT drink any beverages with calories or carbs (this can lead to high blood sugar and weight gain). Also, some of our patients have been very successful with weight loss using the pre made/planned meal planning services that limit calories and portion size (one example is Sensible Meals but there are many out there)  4. Exercise, 5 days per week, 30 minutes per day, as tolerated  5. Recommend good cholesterol, blood pressure, blood sugar levels     In some people, fatty liver can progress to steatohepatitis (inflamatory fatty liver) and possibly to cirrhosis, putting one at increased risk for liver cancer,  liver failure, and death. Therefore, the lifestyle changes are very important to decrease this risk.     Website with information about fatty liver and inflammation related to fatty liver (HONG) = www.nashtruth.com  AND www.[email or web address]

## 2022-05-16 ENCOUNTER — PATIENT MESSAGE (OUTPATIENT)
Dept: HEPATOLOGY | Facility: CLINIC | Age: 27
End: 2022-05-16
Payer: COMMERCIAL

## 2022-05-16 LAB
HAV IGG SER QL IA: POSITIVE
HBV CORE AB SERPL QL IA: NEGATIVE
HBV SURFACE AB SER-ACNC: NEGATIVE M[IU]/ML
HBV SURFACE AG SERPL QL IA: NEGATIVE
HCV AB SERPL QL IA: NEGATIVE

## 2022-05-26 ENCOUNTER — OFFICE VISIT (OUTPATIENT)
Dept: HEPATOLOGY | Facility: CLINIC | Age: 27
End: 2022-05-26
Payer: COMMERCIAL

## 2022-05-26 ENCOUNTER — PATIENT MESSAGE (OUTPATIENT)
Dept: PHARMACY | Facility: CLINIC | Age: 27
End: 2022-05-26
Payer: COMMERCIAL

## 2022-05-26 ENCOUNTER — PROCEDURE VISIT (OUTPATIENT)
Dept: HEPATOLOGY | Facility: CLINIC | Age: 27
End: 2022-05-26
Payer: COMMERCIAL

## 2022-05-26 VITALS
RESPIRATION RATE: 18 BRPM | HEIGHT: 62 IN | BODY MASS INDEX: 35.22 KG/M2 | TEMPERATURE: 98 F | OXYGEN SATURATION: 96 % | WEIGHT: 191.38 LBS | DIASTOLIC BLOOD PRESSURE: 81 MMHG | HEART RATE: 83 BPM | SYSTOLIC BLOOD PRESSURE: 131 MMHG

## 2022-05-26 DIAGNOSIS — K76.0 FATTY LIVER: ICD-10-CM

## 2022-05-26 DIAGNOSIS — E66.09 CLASS 2 OBESITY DUE TO EXCESS CALORIES WITHOUT SERIOUS COMORBIDITY WITH BODY MASS INDEX (BMI) OF 35.0 TO 35.9 IN ADULT: ICD-10-CM

## 2022-05-26 DIAGNOSIS — Z23 NEED FOR HEPATITIS B VACCINATION: ICD-10-CM

## 2022-05-26 DIAGNOSIS — K76.0 FATTY LIVER: Primary | ICD-10-CM

## 2022-05-26 PROCEDURE — 1159F MED LIST DOCD IN RCRD: CPT | Mod: CPTII,S$GLB,, | Performed by: NURSE PRACTITIONER

## 2022-05-26 PROCEDURE — 99214 OFFICE O/P EST MOD 30 MIN: CPT | Mod: S$GLB,,, | Performed by: NURSE PRACTITIONER

## 2022-05-26 PROCEDURE — 3079F PR MOST RECENT DIASTOLIC BLOOD PRESSURE 80-89 MM HG: ICD-10-PCS | Mod: CPTII,S$GLB,, | Performed by: NURSE PRACTITIONER

## 2022-05-26 PROCEDURE — 3075F SYST BP GE 130 - 139MM HG: CPT | Mod: CPTII,S$GLB,, | Performed by: NURSE PRACTITIONER

## 2022-05-26 PROCEDURE — 99999 PR PBB SHADOW E&M-EST. PATIENT-LVL IV: ICD-10-PCS | Mod: PBBFAC,,, | Performed by: NURSE PRACTITIONER

## 2022-05-26 PROCEDURE — 1160F RVW MEDS BY RX/DR IN RCRD: CPT | Mod: CPTII,S$GLB,, | Performed by: NURSE PRACTITIONER

## 2022-05-26 PROCEDURE — 3079F DIAST BP 80-89 MM HG: CPT | Mod: CPTII,S$GLB,, | Performed by: NURSE PRACTITIONER

## 2022-05-26 PROCEDURE — 3008F BODY MASS INDEX DOCD: CPT | Mod: CPTII,S$GLB,, | Performed by: NURSE PRACTITIONER

## 2022-05-26 PROCEDURE — 91200 LIVER ELASTOGRAPHY: CPT | Mod: S$GLB,,, | Performed by: NURSE PRACTITIONER

## 2022-05-26 PROCEDURE — 1160F PR REVIEW ALL MEDS BY PRESCRIBER/CLIN PHARMACIST DOCUMENTED: ICD-10-PCS | Mod: CPTII,S$GLB,, | Performed by: NURSE PRACTITIONER

## 2022-05-26 PROCEDURE — 99999 PR PBB SHADOW E&M-EST. PATIENT-LVL IV: CPT | Mod: PBBFAC,,, | Performed by: NURSE PRACTITIONER

## 2022-05-26 PROCEDURE — 91200 FIBROSCAN (VIBRATION CONTROLLED TRANSIENT ELASTOGRAPHY): ICD-10-PCS | Mod: S$GLB,,, | Performed by: NURSE PRACTITIONER

## 2022-05-26 PROCEDURE — 99214 PR OFFICE/OUTPT VISIT, EST, LEVL IV, 30-39 MIN: ICD-10-PCS | Mod: S$GLB,,, | Performed by: NURSE PRACTITIONER

## 2022-05-26 PROCEDURE — 3008F PR BODY MASS INDEX (BMI) DOCUMENTED: ICD-10-PCS | Mod: CPTII,S$GLB,, | Performed by: NURSE PRACTITIONER

## 2022-05-26 PROCEDURE — 3044F PR MOST RECENT HEMOGLOBIN A1C LEVEL <7.0%: ICD-10-PCS | Mod: CPTII,S$GLB,, | Performed by: NURSE PRACTITIONER

## 2022-05-26 PROCEDURE — 3044F HG A1C LEVEL LT 7.0%: CPT | Mod: CPTII,S$GLB,, | Performed by: NURSE PRACTITIONER

## 2022-05-26 PROCEDURE — 1159F PR MEDICATION LIST DOCUMENTED IN MEDICAL RECORD: ICD-10-PCS | Mod: CPTII,S$GLB,, | Performed by: NURSE PRACTITIONER

## 2022-05-26 PROCEDURE — 3075F PR MOST RECENT SYSTOLIC BLOOD PRESS GE 130-139MM HG: ICD-10-PCS | Mod: CPTII,S$GLB,, | Performed by: NURSE PRACTITIONER

## 2022-05-26 NOTE — PATIENT INSTRUCTIONS
1. Fibroscan to look for fat or scar tissue in the liver showed significant fatty liver (>67% fat in the liver), no scar tissue  2. Recommend vaccines for Hepatitis  A/B, see below   3. Follow up in 1 year with labs a few days before, fibroscan same day     There is no FDA approved therapy for fatty liver disease. Therefore, these things are important:  Limit alcohol consumption, no more than 1 serving(s) of alcohol in any day (1 serving is 5 ounces of wine, 12 ounces of beer, or 1.5 ounces of liquor) and do NOT recommend any daily alcohol use    2 Weight loss goal of 15-20 lbs, referral for [facility] if interested. Also, if interested in a dietician visit to create a weight loss plan, contact the dietician team at [facility] at [email or web address] to schedule a visit to you can call [facility] in [place]: [phone] and the  will transfer the call to one of the dieticians to schedule an appointment. Or you can also call [phone] to schedule. They do offer video visits   3. Low carb/sugar, high fiber and protein diet.Try to limit your carb intake to LESS than 30-45 grams of carbs with a meal or LESS than 5-10 grams with any snack (total of any snack foods eaten during that time). Use MyFitness Pal stephanie to add up your carbs through the day. Do NOT drink any beverages with calories or carbs (this can lead to high blood sugar and weight gain). Also, some of our patients have been very successful with weight loss using the pre made/planned meal planning services that limit calories and portion size (one example is Sensible Meals but there are many out there)  4. Exercise, 5 days per week, 30 minutes per day, as tolerated  5. Recommend good cholesterol, blood pressure, blood sugar levels     In some people, fatty liver can progress to steatohepatitis (inflamatory fatty liver) and possibly to cirrhosis, putting one at increased risk for liver cancer, liver failure, and  death. Therefore, the lifestyle changes are very important to decrease this risk.     Website with information about fatty liver and inflammation related to fatty liver (HONG) = www.nashtruth.com  AND www.[email or web address]        HEP B VACCINE  Your labs show that you DO have immunity against Hep A (+ Hep A IgG), so no further vaccine needed for Hep A    Your immunity markers show that you do NOT have immunity against Hepatitis B, so I recommend that you receive the Hepatitis B vaccine. This will protect your liver from the virus, which can make your liver very sick. The vaccine series is either 2 or 3 vaccines (whichever your insurance covers), either:  1. One now, one at 4 weeks (completed after this)  OR  2.  One now, one at 4 weeks and one 6 months after the 1st one.    I sent the 2 series vaccine to the [facility]. I recommend calling them in a couple of days to see if the vaccine is covered by your insurance and arrange the vaccines if they are covered. Their number is [phone].      If the vaccine is not covered at the pharmacy level, I sent an order that you can get the vaccine in the infectious disease department at [facility]. If that is the case, please call [phone]  to schedule your vaccine administration appointment in the infectious disease department.  If you need to proceed with vaccines with the infectious disease department, you can call to obtain a cost for these vaccines before you proceed, you can call the [facility] office at [phone] or [phone]

## 2022-05-26 NOTE — PROCEDURES
FibroScan (Vibration Controlled Transient Elastography)    Date/Time: 5/26/2022 8:00 AM  Performed by: Ananya Cesar NP  Authorized by: Ananya Cesar NP     Diagnosis:  NAFLD    Probe:  XL    Universal Protocol: Patient's identity, procedure and site were verified, confirmatory pause was performed.  Discussed procedure including risks and potential complications.  Questions answered.  Patient verbalizes understanding and wishes to proceed with VCTE.     Procedure: After providing explanations of the procedure, patient was placed in the supine position with right arm in maximum abduction to allow optimal exposure of right lateral abdomen.  Patient was briefly assessed, Testing was performed in the mid-axillary location, 50Hz Shear Wave pulses were applied and the resulting Shear Wave and Propagation Speed detected with a 3.5 MHz ultrasonic signal, using the FibroScan probe, Skin to liver capsule distance and liver parenchyma were accessed during the entire examination with the FibroScan probe, Patient was instructed to breathe normally and to abstain from sudden movements during the procedure, allowing for random measurements of liver stiffness. At least 10 Shear Waves were produced, Individual measurements of each Shear Wave were calculated.  Patient tolerated the procedure well with no complications.  Meets discharge criteria as was dismissed.  Rates pain 0 out of 10.  Patient will follow up with ordering provider to review results.      Findings  Median liver stiffness score:  4.1  CAP Reading: dB/m:  342    IQR/med %:  20  Interpretation  Fibrosis interpretation is based on medial liver stiffness - Kilopascal (kPa).    Fibrosis Stage:  F 0-1  Steatosis interpretation is based on controlled attenuation parameter - (dB/m).    Steatosis Grade:  S3

## 2022-05-26 NOTE — PROGRESS NOTES
[facility] VISIT FOLLOW UP NOTE    PCP: Primary Doctor No     CHIEF COMPLAINT: fatty liver     HPI: This is a 26 y.o. White female with PMH noted below, presenting for follow up of    fatty liver disease     Negative for Hep B and C    Prior serologic workup:   Lab Results   Component Value Date    HEPBSAG Negative 05/09/2022    HEPCAB Negative 05/09/2022     Risk factors for fatty liver include obesity    Liver fibrosis staging:  -- fibroscan 5/2022 noted F0, S3 (kPA 4.1, )    Interval HPI: Presents today alone. Started Mediterranean diet recently    Labs done 4/2022 show normal transaminase levels x2 in 2014 and 2022  Platelets WNL, alk phos WNL  Synthetic liver functioning WNL    Lab Results   Component Value Date    ALT 22 04/19/2022    AST 16 04/19/2022    ALKPHOS 58 04/19/2022    BILITOT 0.4 04/19/2022    ALBUMIN 3.9 04/19/2022     04/19/2022     Abd CT done 4/2022 showed hepatomegaly, fatty liver, focal fatty sparing     Denies family history of liver disease . Intermittent  Alcohol consumption, see below  Social History     Substance and Sexual Activity   Alcohol Use Yes    Comment: 3-4 servings per month, no daily or heavy alcohol use       + Immunity to Hep A  - needs Hep B vaccine, sent to [facility] pharm and iD         Allergy and medication list reviewed and updated     PMHX:  has a past medical history of Acne, Class 2 obesity in adult, Hepatic steatosis (04/19/2022), and Menorrhagia.    PSHX:  has a past surgical history that includes [place] tooth extraction (07/2015).    FAMILY HISTORY: Updated and reviewed in [facility]    SOCIAL HISTORY:   Social History     Substance and Sexual Activity   Alcohol Use Yes    Comment: 3-4 servings per month, no daily or heavy alcohol use       Social History     Substance and Sexual Activity   Drug Use No       ROS:   GENERAL: Denies fatigue  CARDIOVASCULAR: Denies edema  GI: Denies abdominal pain  SKIN: Denies rash, itching   NEURO: Denies  "confusion, memory loss, or mood changes    PHYSICAL EXAM:   Friendly White female, in no acute distress; alert and oriented to person, place and time  VITALS: /81 (BP Location: Right arm, Patient Position: Sitting, BP Method: Medium (Automatic))   Pulse 83   Temp 98.3 °F (36.8 °C) (Oral)   Resp 18   Ht 5' 2" (1.575 m)   Wt 86.8 kg (191 lb 5.8 oz)   SpO2 96%   BMI 35.00 kg/m²   EYES: Sclerae anicteric  GI: Soft, non-tender, non-distended. No ascites.  EXTREMITIES:  No edema.  SKIN: Warm and dry. No jaundice. No telangectasias noted. No palmar erythema.  NEURO:  No asterixis.  PSYCH:  Thought and speech pattern appropriate. Behavior normal      EDUCATION:  See instructions discussed with patient in Instructions section of the After Visit Summary     ASSESSMENT & PLAN:  26 y.o. White female with:  1.  Fatty liver, likely related to metabolic risk factors   - see HPI  -- + Immunity to Hep A  - needs Hep B vaccine, sent to [facility] pharm and iD  -- Fibroscan 5/2022 noted F0, S3 - will repeat yearly   -- Recommendations discussed with patient:  1. Limit alcohol consumption, no more than 1 serving(s) of alcohol in any day (1 serving is 5 ounces of wine, 12 ounces of beer, or 1.5 ounces of liquor) and do NOT recommend any daily alcohol use    2 Weight loss goal of 20 lbs  3. Low carb/sugar, high fiber and protein diet, limit your carb intake to LESS than 30-45 grams of carbs with a meal or LESS than 5-10 grams with any snack   4. Exercise, 5 days per week, 30 minutes per day, as tolerated  5. Recommend good cholesterol, blood pressure, blood sugar levels   6. Consider enrolling in [facility] fibrosis clinical trails - not a candidate, no fibrosis     2.  Obesity  -- A1c and LDL WNL  -- Body mass index is 35 kg/m².   -- increases risk for fatty liver            Follow up in about 1 year (around 5/26/2023). with labs a few days before, fibroscan same day before  Orders Placed This Encounter   Procedures    FibroScan " (Vibration Controlled Transient Elastography)    Hepatitis B (Recombinant) Adjuvanted, 2 dose    Hepatic Function Panel    Lipid Panel    Ambulatory referral/consult to Infectious Disease Injection Dept        Thank you for allowing me to participate in the care of Natividad CastrejonMARIAA Casiano    I spent a total of 30 minutes on the day of the visit.This includes face to face time and non-face to face time preparing to see the patient (eg, review of tests), obtaining and/or reviewing separately obtained history, documenting clinical information in the electronic or other health record, independently interpreting results and communicating results to the patient/family/caregiver, and coordinating care.         CC'ed note to:

## 2022-09-14 DIAGNOSIS — Z30.41 ENCOUNTER FOR SURVEILLANCE OF CONTRACEPTIVE PILLS: ICD-10-CM

## 2022-09-14 RX ORDER — DROSPIRENONE AND ETHINYL ESTRADIOL 0.02-3(28)
KIT ORAL
Qty: 84 TABLET | Refills: 0 | Status: SHIPPED | OUTPATIENT
Start: 2022-09-14 | End: 2022-12-01 | Stop reason: SDUPTHER

## 2022-12-14 ENCOUNTER — OFFICE VISIT (OUTPATIENT)
Dept: INTERNAL MEDICINE | Facility: CLINIC | Age: 27
End: 2022-12-14
Payer: COMMERCIAL

## 2022-12-14 VITALS
WEIGHT: 176.56 LBS | DIASTOLIC BLOOD PRESSURE: 84 MMHG | SYSTOLIC BLOOD PRESSURE: 124 MMHG | HEART RATE: 81 BPM | HEIGHT: 62 IN | OXYGEN SATURATION: 99 % | TEMPERATURE: 100 F | BODY MASS INDEX: 32.49 KG/M2

## 2022-12-14 DIAGNOSIS — R50.9 FEVER, UNSPECIFIED FEVER CAUSE: ICD-10-CM

## 2022-12-14 DIAGNOSIS — R05.9 COUGH, UNSPECIFIED TYPE: ICD-10-CM

## 2022-12-14 DIAGNOSIS — J02.9 SORE THROAT: Primary | ICD-10-CM

## 2022-12-14 LAB
CTP QC/QA: YES
MOLECULAR STREP A: NEGATIVE
POC MOLECULAR INFLUENZA A AGN: NEGATIVE
POC MOLECULAR INFLUENZA B AGN: NEGATIVE
SARS-COV-2 RDRP RESP QL NAA+PROBE: NEGATIVE

## 2022-12-14 PROCEDURE — 99213 OFFICE O/P EST LOW 20 MIN: CPT | Mod: S$GLB,,, | Performed by: INTERNAL MEDICINE

## 2022-12-14 PROCEDURE — 3079F DIAST BP 80-89 MM HG: CPT | Mod: CPTII,S$GLB,, | Performed by: INTERNAL MEDICINE

## 2022-12-14 PROCEDURE — 3079F PR MOST RECENT DIASTOLIC BLOOD PRESSURE 80-89 MM HG: ICD-10-PCS | Mod: CPTII,S$GLB,, | Performed by: INTERNAL MEDICINE

## 2022-12-14 PROCEDURE — 87651 POCT STREP A MOLECULAR: ICD-10-PCS | Mod: QW,S$GLB,, | Performed by: INTERNAL MEDICINE

## 2022-12-14 PROCEDURE — 3044F HG A1C LEVEL LT 7.0%: CPT | Mod: CPTII,S$GLB,, | Performed by: INTERNAL MEDICINE

## 2022-12-14 PROCEDURE — 1159F MED LIST DOCD IN RCRD: CPT | Mod: CPTII,S$GLB,, | Performed by: INTERNAL MEDICINE

## 2022-12-14 PROCEDURE — 3074F SYST BP LT 130 MM HG: CPT | Mod: CPTII,S$GLB,, | Performed by: INTERNAL MEDICINE

## 2022-12-14 PROCEDURE — 1159F PR MEDICATION LIST DOCUMENTED IN MEDICAL RECORD: ICD-10-PCS | Mod: CPTII,S$GLB,, | Performed by: INTERNAL MEDICINE

## 2022-12-14 PROCEDURE — 99999 PR PBB SHADOW E&M-EST. PATIENT-LVL III: ICD-10-PCS | Mod: PBBFAC,,, | Performed by: INTERNAL MEDICINE

## 2022-12-14 PROCEDURE — 99999 PR PBB SHADOW E&M-EST. PATIENT-LVL III: CPT | Mod: PBBFAC,,, | Performed by: INTERNAL MEDICINE

## 2022-12-14 PROCEDURE — 1160F PR REVIEW ALL MEDS BY PRESCRIBER/CLIN PHARMACIST DOCUMENTED: ICD-10-PCS | Mod: CPTII,S$GLB,, | Performed by: INTERNAL MEDICINE

## 2022-12-14 PROCEDURE — 87502 INFLUENZA DNA AMP PROBE: CPT | Mod: QW,S$GLB,, | Performed by: INTERNAL MEDICINE

## 2022-12-14 PROCEDURE — 87635 SARS-COV-2 COVID-19 AMP PRB: CPT | Mod: QW,S$GLB,, | Performed by: INTERNAL MEDICINE

## 2022-12-14 PROCEDURE — 3074F PR MOST RECENT SYSTOLIC BLOOD PRESSURE < 130 MM HG: ICD-10-PCS | Mod: CPTII,S$GLB,, | Performed by: INTERNAL MEDICINE

## 2022-12-14 PROCEDURE — 1160F RVW MEDS BY RX/DR IN RCRD: CPT | Mod: CPTII,S$GLB,, | Performed by: INTERNAL MEDICINE

## 2022-12-14 PROCEDURE — 3044F PR MOST RECENT HEMOGLOBIN A1C LEVEL <7.0%: ICD-10-PCS | Mod: CPTII,S$GLB,, | Performed by: INTERNAL MEDICINE

## 2022-12-14 PROCEDURE — 99213 PR OFFICE/OUTPT VISIT, EST, LEVL III, 20-29 MIN: ICD-10-PCS | Mod: S$GLB,,, | Performed by: INTERNAL MEDICINE

## 2022-12-14 PROCEDURE — 3008F PR BODY MASS INDEX (BMI) DOCUMENTED: ICD-10-PCS | Mod: CPTII,S$GLB,, | Performed by: INTERNAL MEDICINE

## 2022-12-14 PROCEDURE — 87651 STREP A DNA AMP PROBE: CPT | Mod: QW,S$GLB,, | Performed by: INTERNAL MEDICINE

## 2022-12-14 PROCEDURE — 87635: ICD-10-PCS | Mod: QW,S$GLB,, | Performed by: INTERNAL MEDICINE

## 2022-12-14 PROCEDURE — 3008F BODY MASS INDEX DOCD: CPT | Mod: CPTII,S$GLB,, | Performed by: INTERNAL MEDICINE

## 2022-12-14 PROCEDURE — 87502 POCT INFLUENZA A/B MOLECULAR: ICD-10-PCS | Mod: QW,S$GLB,, | Performed by: INTERNAL MEDICINE

## 2022-12-14 NOTE — PROGRESS NOTES
Subjective:       Patient ID: Natividad Zaldivar is a 27 y.o. female.   Chief Complaint: Cough, Fatigue, Sore Throat, and Generalized Body Aches    Pt went to a Wedding Saturday, found out several folks had Covid the following day. She began with symptoms Sunday and they worsened. Went to  2 days later and had negative Covid test. No other tests. Throat hurts the most. No exposure to strep or Flu. Some cough, congestion and fever.   Tried Zicam, and was given Prednisone and Tessalon but did not pick those two up.   No ibuprofen.     Review of Systems   Constitutional:  Positive for fatigue and fever.   HENT:  Positive for nasal congestion, sinus pressure/congestion and sore throat.    Respiratory:  Positive for cough.         Objective:      Physical Exam  Constitutional:       General: She is not in acute distress.     Appearance: She is well-developed.   HENT:      Head: Normocephalic and atraumatic.      Right Ear: Tympanic membrane, ear canal and external ear normal.      Left Ear: Tympanic membrane, ear canal and external ear normal.      Mouth/Throat:      Pharynx: Posterior oropharyngeal erythema present. No oropharyngeal exudate.   Eyes:      General: No scleral icterus.     Conjunctiva/sclera: Conjunctivae normal.      Pupils: Pupils are equal, round, and reactive to light.   Neck:      Thyroid: No thyromegaly.   Cardiovascular:      Rate and Rhythm: Normal rate and regular rhythm.      Pulses: Normal pulses.      Heart sounds: No murmur heard.  Pulmonary:      Effort: Pulmonary effort is normal.      Breath sounds: Normal breath sounds. No wheezing.   Abdominal:      General: Bowel sounds are normal. There is no distension.      Palpations: Abdomen is soft.      Tenderness: There is no abdominal tenderness.   Musculoskeletal:         General: No tenderness.      Cervical back: Normal range of motion and neck supple.      Right lower leg: No edema.      Left lower leg: No edema.   Lymphadenopathy:       "Cervical: No cervical adenopathy.   Skin:     Coloration: Skin is not jaundiced.      Findings: No rash.   Neurological:      General: No focal deficit present.      Mental Status: She is alert and oriented to person, place, and time.   Psychiatric:         Mood and Affect: Mood normal.         Behavior: Behavior normal.       Assessment:       Problem List Items Addressed This Visit    None  Visit Diagnoses       Sore throat    -  Primary    Relevant Orders    POCT Influenza A/B Molecular    POCT COVID-19 Rapid Screening    POCT Strep A, Molecular    Cough, unspecified type        Relevant Orders    POCT Influenza A/B Molecular    POCT COVID-19 Rapid Screening    POCT Strep A, Molecular    Fever, unspecified fever cause        Relevant Orders    POCT Influenza A/B Molecular    POCT COVID-19 Rapid Screening    POCT Strep A, Molecular            Plan:       Natividad was seen today for cough, fatigue, sore throat and generalized body aches.    Diagnoses and all orders for this visit:    Sore throat  -     POCT Influenza A/B Molecular  -     POCT COVID-19 Rapid Screening  -     POCT Strep A, Molecular    Cough, unspecified type  -     POCT Influenza A/B Molecular  -     POCT COVID-19 Rapid Screening  -     POCT Strep A, Molecular    Fever, unspecified fever cause  -     POCT Influenza A/B Molecular  -     POCT COVID-19 Rapid Screening  -     POCT Strep A, Molecular         All three swabs are negative. Will treat symptoms. Take meds she was given in UC.     His symptoms fail to improve in the next few days consider an antibiotic to cover the throat and sinuses        Portions of this note may have been created with voice recognition software. Occasional "wrong-word" or "sound-a-like" substitutions may have occurred due to the inherent limitations of voice recognition software. Please, read the note carefully and recognize, using context, where substitutions have occurred.  "

## 2022-12-15 ENCOUNTER — PATIENT MESSAGE (OUTPATIENT)
Dept: INTERNAL MEDICINE | Facility: CLINIC | Age: 27
End: 2022-12-15
Payer: COMMERCIAL

## 2023-02-15 ENCOUNTER — OFFICE VISIT (OUTPATIENT)
Dept: OBSTETRICS AND GYNECOLOGY | Facility: CLINIC | Age: 28
End: 2023-02-15
Attending: OBSTETRICS & GYNECOLOGY
Payer: COMMERCIAL

## 2023-02-15 VITALS
SYSTOLIC BLOOD PRESSURE: 116 MMHG | WEIGHT: 187.06 LBS | BODY MASS INDEX: 34.21 KG/M2 | DIASTOLIC BLOOD PRESSURE: 74 MMHG

## 2023-02-15 DIAGNOSIS — Z30.41 ENCOUNTER FOR SURVEILLANCE OF CONTRACEPTIVE PILLS: ICD-10-CM

## 2023-02-15 DIAGNOSIS — Z12.4 CERVICAL CANCER SCREENING: ICD-10-CM

## 2023-02-15 DIAGNOSIS — Z01.419 ENCOUNTER FOR GYNECOLOGICAL EXAMINATION (GENERAL) (ROUTINE) WITHOUT ABNORMAL FINDINGS: Primary | ICD-10-CM

## 2023-02-15 PROCEDURE — 3074F PR MOST RECENT SYSTOLIC BLOOD PRESSURE < 130 MM HG: ICD-10-PCS | Mod: CPTII,S$GLB,, | Performed by: OBSTETRICS & GYNECOLOGY

## 2023-02-15 PROCEDURE — 99395 PREV VISIT EST AGE 18-39: CPT | Mod: S$GLB,,, | Performed by: OBSTETRICS & GYNECOLOGY

## 2023-02-15 PROCEDURE — 99395 PR PREVENTIVE VISIT,EST,18-39: ICD-10-PCS | Mod: S$GLB,,, | Performed by: OBSTETRICS & GYNECOLOGY

## 2023-02-15 PROCEDURE — 99999 PR PBB SHADOW E&M-EST. PATIENT-LVL II: CPT | Mod: PBBFAC,,, | Performed by: OBSTETRICS & GYNECOLOGY

## 2023-02-15 PROCEDURE — 1159F PR MEDICATION LIST DOCUMENTED IN MEDICAL RECORD: ICD-10-PCS | Mod: CPTII,S$GLB,, | Performed by: OBSTETRICS & GYNECOLOGY

## 2023-02-15 PROCEDURE — 3074F SYST BP LT 130 MM HG: CPT | Mod: CPTII,S$GLB,, | Performed by: OBSTETRICS & GYNECOLOGY

## 2023-02-15 PROCEDURE — 3008F BODY MASS INDEX DOCD: CPT | Mod: CPTII,S$GLB,, | Performed by: OBSTETRICS & GYNECOLOGY

## 2023-02-15 PROCEDURE — 1159F MED LIST DOCD IN RCRD: CPT | Mod: CPTII,S$GLB,, | Performed by: OBSTETRICS & GYNECOLOGY

## 2023-02-15 PROCEDURE — 99999 PR PBB SHADOW E&M-EST. PATIENT-LVL II: ICD-10-PCS | Mod: PBBFAC,,, | Performed by: OBSTETRICS & GYNECOLOGY

## 2023-02-15 PROCEDURE — 3008F PR BODY MASS INDEX (BMI) DOCUMENTED: ICD-10-PCS | Mod: CPTII,S$GLB,, | Performed by: OBSTETRICS & GYNECOLOGY

## 2023-02-15 PROCEDURE — 88175 CYTOPATH C/V AUTO FLUID REDO: CPT | Performed by: OBSTETRICS & GYNECOLOGY

## 2023-02-15 PROCEDURE — 3078F DIAST BP <80 MM HG: CPT | Mod: CPTII,S$GLB,, | Performed by: OBSTETRICS & GYNECOLOGY

## 2023-02-15 PROCEDURE — 3078F PR MOST RECENT DIASTOLIC BLOOD PRESSURE < 80 MM HG: ICD-10-PCS | Mod: CPTII,S$GLB,, | Performed by: OBSTETRICS & GYNECOLOGY

## 2023-02-15 RX ORDER — DROSPIRENONE AND ETHINYL ESTRADIOL 0.02-3(28)
1 KIT ORAL DAILY
Qty: 84 TABLET | Refills: 3 | Status: SHIPPED | OUTPATIENT
Start: 2023-02-15 | End: 2024-01-20

## 2023-02-15 NOTE — PROGRESS NOTES
Subjective:       Patient ID: Natividad Zaldivar is a 27 y.o. female.    Chief Complaint:  Well Woman        History of Present Illness  Natividad Zaldivar is a 27 y.o. female  who presents for annual. Doing well on OCP. Periods still occasionally irregular even on OCP but ok with it. Was oligo off OCP. Overall doing well. All questions answered.     Patient's last menstrual period was 2023.   Date of Last Pap: No result found    Review of Systems  Review of Systems   Constitutional:  Negative for chills and fever.      Objective:   Physical Exam:   Constitutional: She is oriented to person, place, and time. Vital signs are normal. She appears well-developed and well-nourished. No distress.        Pulmonary/Chest: She exhibits no mass. Right breast exhibits no mass, no nipple discharge, no skin change, no tenderness, no bleeding and no swelling. Left breast exhibits no mass, no nipple discharge, no skin change, no tenderness, no bleeding and no swelling. Breasts are symmetrical.        Abdominal: Soft. Bowel sounds are normal. She exhibits no distension and no mass. There is no abdominal tenderness. There is no rebound.     Genitourinary:    Vagina and uterus normal.   There is no rash, tenderness, lesion or injury on the right labia. There is no rash, tenderness, lesion or injury on the left labia. Cervix is normal. Right adnexum displays no mass, no tenderness and no fullness. Left adnexum displays no mass, no tenderness and no fullness. No erythema,  no vaginal discharge, tenderness, rectocele, cystocele or unspecified prolapse of vaginal walls in the vagina. Cervix exhibits no motion tenderness, no discharge and no friability. Uterus is not deviated, not enlarged, not fixed, not tender and not hosting fibroids.           Musculoskeletal: Normal range of motion and moves all extremeties.      Lymphadenopathy:        Right: No supraclavicular adenopathy present.        Left: No supraclavicular adenopathy present.     Neurological: She is alert and oriented to person, place, and time.    Skin: Skin is warm and dry.    Psychiatric: She has a normal mood and affect. Her behavior is normal. Judgment normal.      Assessment/ Plan:     1. Encounter for gynecological examination (general) (routine) without abnormal findings        2. Encounter for surveillance of contraceptive pills  drospirenone-ethinyl estradioL (KHADIJAH) 3-0.02 mg per tablet          No follow-ups on file.    As of April 1, 2021, the Cures Act has been passed nationally. This new law requires that all doctors progress notes, lab results, pathology reports and radiology reports be released IMMEDIATELY to the patient in the patient portal. That means that the results are released to you at the EXACT same time they are released to me. Therefore, with all of the patients that I have I am not able to reply to each patient exactly when the results come in. So there will be a delay from when you see the results to when I see them and have time to come up with a response to send you. Also I only see these results when I am on the computer at work. So if the results come in over the weekend or after 5 pm of a work day, I will not see them until the next business day. As you can tell, this is a challenge as a physician to give every patient the quick response they hope for and deserve. So please be patient! Thanks for understanding, Dr. Peñaloza

## 2023-05-18 DIAGNOSIS — K76.0 FATTY LIVER: Primary | ICD-10-CM

## 2023-05-19 ENCOUNTER — TELEPHONE (OUTPATIENT)
Dept: HEPATOLOGY | Facility: CLINIC | Age: 28
End: 2023-05-19
Payer: COMMERCIAL

## 2023-05-19 ENCOUNTER — LAB VISIT (OUTPATIENT)
Dept: LAB | Facility: HOSPITAL | Age: 28
End: 2023-05-19
Payer: COMMERCIAL

## 2023-05-19 DIAGNOSIS — K76.0 FATTY LIVER: ICD-10-CM

## 2023-05-19 LAB
ALBUMIN SERPL BCP-MCNC: 3.9 G/DL (ref 3.5–5.2)
ALP SERPL-CCNC: 51 U/L (ref 55–135)
ALT SERPL W/O P-5'-P-CCNC: 17 U/L (ref 10–44)
AST SERPL-CCNC: 11 U/L (ref 10–40)
BILIRUB DIRECT SERPL-MCNC: 0.1 MG/DL (ref 0.1–0.3)
BILIRUB SERPL-MCNC: 0.3 MG/DL (ref 0.1–1)
CHOLEST SERPL-MCNC: 206 MG/DL (ref 120–199)
CHOLEST/HDLC SERPL: 3.5 {RATIO} (ref 2–5)
HDLC SERPL-MCNC: 59 MG/DL (ref 40–75)
HDLC SERPL: 28.6 % (ref 20–50)
LDLC SERPL CALC-MCNC: 129.2 MG/DL (ref 63–159)
NONHDLC SERPL-MCNC: 147 MG/DL
PROT SERPL-MCNC: 7 G/DL (ref 6–8.4)
TRIGL SERPL-MCNC: 89 MG/DL (ref 30–150)

## 2023-05-19 PROCEDURE — 36415 COLL VENOUS BLD VENIPUNCTURE: CPT | Performed by: NURSE PRACTITIONER

## 2023-05-19 PROCEDURE — 80076 HEPATIC FUNCTION PANEL: CPT | Performed by: NURSE PRACTITIONER

## 2023-05-19 PROCEDURE — 80061 LIPID PANEL: CPT | Performed by: NURSE PRACTITIONER

## 2023-05-19 NOTE — TELEPHONE ENCOUNTER
----- Message from Alexander Fraga sent at 5/19/2023 11:21 AM CDT -----  Regarding: R/S Fibroscan  Patient called in requesting again to have her Fibroscan reschedule to 7/11 instead of 5/26. Patient has already rescheduled her provider visit for 7/11 and now needs her Fibroscan changed as well.                             Contact: [phone]

## 2023-07-11 ENCOUNTER — PROCEDURE VISIT (OUTPATIENT)
Dept: HEPATOLOGY | Facility: CLINIC | Age: 28
End: 2023-07-11
Payer: COMMERCIAL

## 2023-07-11 ENCOUNTER — OFFICE VISIT (OUTPATIENT)
Dept: HEPATOLOGY | Facility: CLINIC | Age: 28
End: 2023-07-11
Payer: COMMERCIAL

## 2023-07-11 VITALS — WEIGHT: 199.06 LBS | BODY MASS INDEX: 36.63 KG/M2 | HEIGHT: 62 IN

## 2023-07-11 DIAGNOSIS — Z23 NEED FOR HEPATITIS B VACCINATION: ICD-10-CM

## 2023-07-11 DIAGNOSIS — K76.0 FATTY LIVER: ICD-10-CM

## 2023-07-11 DIAGNOSIS — E66.09 CLASS 2 OBESITY DUE TO EXCESS CALORIES WITHOUT SERIOUS COMORBIDITY WITH BODY MASS INDEX (BMI) OF 36.0 TO 36.9 IN ADULT: ICD-10-CM

## 2023-07-11 DIAGNOSIS — K76.0 FATTY LIVER: Primary | ICD-10-CM

## 2023-07-11 PROCEDURE — 76981 USE PARENCHYMA: CPT | Mod: S$GLB,,, | Performed by: NURSE PRACTITIONER

## 2023-07-11 PROCEDURE — 99999 PR PBB SHADOW E&M-EST. PATIENT-LVL III: ICD-10-PCS | Mod: PBBFAC,,, | Performed by: NURSE PRACTITIONER

## 2023-07-11 PROCEDURE — 99214 PR OFFICE/OUTPT VISIT, EST, LEVL IV, 30-39 MIN: ICD-10-PCS | Mod: S$GLB,,, | Performed by: NURSE PRACTITIONER

## 2023-07-11 PROCEDURE — 76981 FIBROSCAN NEW ORLEANS (VIBRATION CONTROLLED TRANSIENT ELASTOGRAPHY): ICD-10-PCS | Mod: S$GLB,,, | Performed by: NURSE PRACTITIONER

## 2023-07-11 PROCEDURE — 99214 OFFICE O/P EST MOD 30 MIN: CPT | Mod: S$GLB,,, | Performed by: NURSE PRACTITIONER

## 2023-07-11 PROCEDURE — 99999 PR PBB SHADOW E&M-EST. PATIENT-LVL III: CPT | Mod: PBBFAC,,, | Performed by: NURSE PRACTITIONER

## 2023-07-11 RX ORDER — HEPATITIS B VACCINE (RECOMBINANT) ADJUVANTED 20 UG/.5ML
0.5 INJECTION, SOLUTION INTRAMUSCULAR ONCE
Qty: 0.5 ML | Refills: 0 | Status: SHIPPED | OUTPATIENT
Start: 2023-07-11 | End: 2023-07-11

## 2023-07-11 NOTE — PROGRESS NOTES
[facility] VISIT FOLLOW UP NOTE    PCP: Primary Doctor No     CHIEF COMPLAINT: fatty liver     HPI: This is a 28 y.o. White female with PMH noted below, presenting for follow up of above    Negative for Hep B and C    Prior serologic workup:   Lab Results   Component Value Date    HEPBSAG Negative 05/09/2022    HEPCAB Negative 05/09/2022     Risk factors for fatty liver include obesity    Liver fibrosis staging:  -- fibroscan 5/2022 noted F0, S3 (kPA 4.1, )  -- fibroscan 7/2023 noted F0, S3 (kPA 5, )    Interval HPI: Presents today alone. Trying to limit red meat, trying more plant based but needs to be mindful of carbs  Weight loss since last visit (lowest weight 165 lbs), but has since gained it back (Current wt 199 lbs)  Needs to restart exercise  Fibroscan remaisn >70% fat in the liver     Labs done 5/2023 show normal transaminase levels   Platelets WNL, alk phos WNL  Synthetic liver functioning WNL    Lab Results   Component Value Date    ALT 17 05/19/2023    AST 11 05/19/2023    ALKPHOS 51 (L) 05/19/2023    BILITOT 0.3 05/19/2023    ALBUMIN 3.9 05/19/2023     04/19/2022     Abd CT done 4/2022 showed hepatomegaly, fatty liver, focal fatty sparing - can repeat in 2025    Denies family history of liver disease . Intermittent  Alcohol consumption, see below  Social History     Substance and Sexual Activity   Alcohol Use Yes    Comment: 3-4 servings per month, no daily or heavy alcohol use       + Immunity to Hep A  - needs Hep B vaccine, reminded pt to get 2nd dose soon, can repeat titers with next visit          Allergy and medication list reviewed and updated     PMHX:  has a past medical history of Acne, Class 2 obesity in adult, Hepatic steatosis (04/19/2022), and Menorrhagia.    PSHX:  has a past surgical history that includes [place] tooth extraction (07/2015).    FAMILY HISTORY: Updated and reviewed in [facility]    SOCIAL HISTORY:   Social History     Substance and Sexual  "Activity   Alcohol Use Yes    Comment: 3-4 servings per month, no daily or heavy alcohol use       Social History     Substance and Sexual Activity   Drug Use No       ROS:   GENERAL: Denies fatigue  CARDIOVASCULAR: Denies edema  GI: Denies abdominal pain  SKIN: Denies rash, itching   NEURO: Denies confusion, memory loss, or mood changes    PHYSICAL EXAM:   Friendly White female, in no acute distress; alert and oriented to person, place and time  VITALS: Ht 5' 2" (1.575 m)   Wt 90.3 kg (199 lb 1.2 oz)   BMI 36.41 kg/m²   EYES: Sclerae anicteric  GI: Soft, non-tender, non-distended. No ascites.  EXTREMITIES:  No edema.  SKIN: Warm and dry. No jaundice. No telangectasias noted. No palmar erythema.  NEURO:  No asterixis.  PSYCH:  Thought and speech pattern appropriate. Behavior normal      EDUCATION:  See instructions discussed with patient in Instructions section of the After Visit Summary     ASSESSMENT & PLAN:  28 y.o. White female with:  1.  Fatty liver, likely related to metabolic risk factors   - see HPI  -- + Immunity to Hep A  - see HPI  -- Fibroscan 7/2023 noted F0, S3 - will repeat yearly   -- Recommendations discussed with patient:  Limit alcohol consumption, no more than 1 serving(s) of alcohol in any day (1 serving is 5 ounces of wine, 12 ounces of beer, or 1.5 ounces of liquor) and do NOT recommend any daily alcohol use    2 Weight loss goal of 20-40  lbs  3. Low carb/sugar, high fiber and protein diet, limit your carb intake to LESS than 30-45 grams of carbs with a meal or LESS than 5-10 grams with any snack   4. Exercise, 5 days per week, 30 minutes per day, as tolerated  5. Recommend good cholesterol, blood pressure, blood sugar levels     2.  Obesity  -- Body mass index is 36.41 kg/m².   -- increases risk for fatty liver            Follow up in about 1 year (around 7/11/2024). with labs a few days before, fibroscan same day before  Orders Placed This Encounter   Procedures    FibroScan [place] " (Vibration Controlled Transient Elastography)    Hepatic Function Panel    Hepatitis B Surface Antibody, Qual/Quant        Thank you for allowing me to participate in the care of Natividad Zaldivar    [place]    I spent a total of 30 minutes on the day of the visit.This includes face to face time and non-face to face time preparing to see the patient (eg, review of tests), obtaining and/or reviewing separately obtained history, documenting clinical information in the electronic or other health record, independently interpreting results and communicating results to the patient/family/caregiver, and coordinating care.         CC'ed note to:

## 2023-07-11 NOTE — PATIENT INSTRUCTIONS
1. Fibroscan to look for fat or scar tissue in the liver showed significant fatty liver (>67% fat in the liver), no scar tissue  2. Recommend vaccines for Hepatitis  A/B, see below   3. Follow up in 1 year with labs a few days before, fibroscan same day     There is no FDA approved therapy for fatty liver disease. Therefore, these things are important:  Limit alcohol consumption, no more than 1 serving(s) of alcohol in any day (1 serving is 5 ounces of wine, 12 ounces of beer, or 1.5 ounces of liquor) and do NOT recommend any daily alcohol use    2 Weight loss goal of 15-20 lbs, referral for [facility] if interested. Also, if interested in a dietician visit to create a weight loss plan, contact the dietician team at [facility] at [email or web address] to schedule a visit to you can call [facility] in [place]: [phone] and the  will transfer the call to one of the dieticians to schedule an appointment. Or you can also call [phone] to schedule. They do offer video visits   3. Low carb/sugar, high fiber and protein diet.Try to limit your carb intake to LESS than 30-45 grams of carbs with a meal or LESS than 5-10 grams with any snack (total of any snack foods eaten during that time). Use MyFitness Pal stephanie to add up your carbs through the day. Do NOT drink any beverages with calories or carbs (this can lead to high blood sugar and weight gain). Also, some of our patients have been very successful with weight loss using the pre made/planned meal planning services that limit calories and portion size (one example is Sensible Meals but there are many out there)  4. Exercise, 5 days per week, 30 minutes per day, as tolerated  5. Recommend good cholesterol, blood pressure, blood sugar levels     In some people, fatty liver can progress to steatohepatitis (inflamatory fatty liver) and possibly to cirrhosis, putting one at increased risk for liver cancer, liver failure, and  death. Therefore, the lifestyle changes are very important to decrease this risk.     Website with information about fatty liver and inflammation related to fatty liver ([facility]) = www.nashtruth.com  AND www.NASHactually.com        HEP B VACCINE  Your labs show that you DO have immunity against Hep A (+ Hep A IgG), so no further vaccine needed for Hep A    Your immunity markers show that you do NOT have immunity against Hepatitis B, so I recommend that you receive the Hepatitis B vaccine. This will protect your liver from the virus, which can make your liver very sick. The vaccine series is either 2 or 3 vaccines (whichever your insurance covers), either:  1. One now, one at 4 weeks (completed after this)  OR  2.  One now, one at 4 weeks and one 6 months after the 1st one.    I sent the 2 series vaccine to the [facility]. I recommend calling them in a couple of days to see if the vaccine is covered by your insurance and arrange the vaccines if they are covered. Their number is [phone].      If the vaccine is not covered at the pharmacy level, I sent an order that you can get the vaccine in the infectious disease department at [facility]. If that is the case, please call [phone]  to schedule your vaccine administration appointment in the infectious disease department.  If you need to proceed with vaccines with the infectious disease department, you can call to obtain a cost for these vaccines before you proceed, you can call the [facility] office at [phone] or [phone]

## 2023-07-11 NOTE — PROCEDURES
FibroScan [place] (Vibration Controlled Transient Elastography)    Date/Time: 7/11/2023 8:30 AM  Performed by: Ananya Cesar NP  Authorized by: Ananya Cesar NP     Diagnosis:  NAFLD    Probe:  M    Universal Protocol: Patient's identity, procedure and site were verified, confirmatory pause was performed.  Discussed procedure including risks and potential complications.  Questions answered.  Patient verbalizes understanding and wishes to proceed with VCTE.     Procedure: After providing explanations of the procedure, patient was placed in the supine position with right arm in maximum abduction to allow optimal exposure of right lateral abdomen.  Patient was briefly assessed, Testing was performed in the mid-axillary location, 50Hz Shear Wave pulses were applied and the resulting Shear Wave and Propagation Speed detected with a 3.5 MHz ultrasonic signal, using the FibroScan probe, Skin to liver capsule distance and liver parenchyma were accessed during the entire examination with the FibroScan probe, Patient was instructed to breathe normally and to abstain from sudden movements during the procedure, allowing for random measurements of liver stiffness. At least 10 Shear Waves were produced, Individual measurements of each Shear Wave were calculated.  Patient tolerated the procedure well with no complications.  Meets discharge criteria as was dismissed.  Rates pain 0 out of 10.  Patient will follow up with ordering provider to review results.    Findings  Median liver stiffness score:  5  CAP Reading: dB/m:  324    IQR/med %:  2  Interpretation  Fibrosis interpretation is based on medial liver stiffness - Kilopascal (kPa).    Fibrosis Stage:  F 0-1  Steatosis interpretation is based on controlled attenuation parameter - (dB/m).    Steatosis Grade:  S3

## 2023-10-17 ENCOUNTER — TELEPHONE (OUTPATIENT)
Dept: INTERNAL MEDICINE | Facility: CLINIC | Age: 28
End: 2023-10-17

## 2023-10-17 ENCOUNTER — OFFICE VISIT (OUTPATIENT)
Dept: INTERNAL MEDICINE | Facility: CLINIC | Age: 28
End: 2023-10-17
Payer: COMMERCIAL

## 2023-10-17 ENCOUNTER — LAB VISIT (OUTPATIENT)
Dept: LAB | Facility: HOSPITAL | Age: 28
End: 2023-10-17
Payer: COMMERCIAL

## 2023-10-17 VITALS
WEIGHT: 205.94 LBS | OXYGEN SATURATION: 99 % | HEART RATE: 76 BPM | DIASTOLIC BLOOD PRESSURE: 80 MMHG | BODY MASS INDEX: 37.9 KG/M2 | SYSTOLIC BLOOD PRESSURE: 120 MMHG | HEIGHT: 62 IN

## 2023-10-17 DIAGNOSIS — R53.83 FATIGUE, UNSPECIFIED TYPE: Primary | ICD-10-CM

## 2023-10-17 DIAGNOSIS — M54.2 NECK PAIN: ICD-10-CM

## 2023-10-17 DIAGNOSIS — R51.9 NONINTRACTABLE HEADACHE, UNSPECIFIED CHRONICITY PATTERN, UNSPECIFIED HEADACHE TYPE: ICD-10-CM

## 2023-10-17 DIAGNOSIS — R53.83 FATIGUE, UNSPECIFIED TYPE: ICD-10-CM

## 2023-10-17 LAB
25(OH)D3+25(OH)D2 SERPL-MCNC: 40 NG/ML (ref 30–96)
ALBUMIN SERPL BCP-MCNC: 4 G/DL (ref 3.5–5.2)
ALP SERPL-CCNC: 59 U/L (ref 55–135)
ALT SERPL W/O P-5'-P-CCNC: 19 U/L (ref 10–44)
ANION GAP SERPL CALC-SCNC: 11 MMOL/L (ref 8–16)
AST SERPL-CCNC: 15 U/L (ref 10–40)
BASOPHILS # BLD AUTO: 0.07 K/UL (ref 0–0.2)
BASOPHILS NFR BLD: 0.8 % (ref 0–1.9)
BILIRUB SERPL-MCNC: 0.3 MG/DL (ref 0.1–1)
BUN SERPL-MCNC: 11 MG/DL (ref 6–20)
CALCIUM SERPL-MCNC: 10 MG/DL (ref 8.7–10.5)
CHLORIDE SERPL-SCNC: 106 MMOL/L (ref 95–110)
CO2 SERPL-SCNC: 24 MMOL/L (ref 23–29)
CREAT SERPL-MCNC: 0.8 MG/DL (ref 0.5–1.4)
DIFFERENTIAL METHOD: NORMAL
EOSINOPHIL # BLD AUTO: 0.3 K/UL (ref 0–0.5)
EOSINOPHIL NFR BLD: 2.9 % (ref 0–8)
ERYTHROCYTE [DISTWIDTH] IN BLOOD BY AUTOMATED COUNT: 12.4 % (ref 11.5–14.5)
EST. GFR  (NO RACE VARIABLE): >60 ML/MIN/1.73 M^2
GLUCOSE SERPL-MCNC: 86 MG/DL (ref 70–110)
HCT VFR BLD AUTO: 40.9 % (ref 37–48.5)
HGB BLD-MCNC: 13.3 G/DL (ref 12–16)
IMM GRANULOCYTES # BLD AUTO: 0.01 K/UL (ref 0–0.04)
IMM GRANULOCYTES NFR BLD AUTO: 0.1 % (ref 0–0.5)
LYMPHOCYTES # BLD AUTO: 3.4 K/UL (ref 1–4.8)
LYMPHOCYTES NFR BLD: 39.5 % (ref 18–48)
MCH RBC QN AUTO: 29.9 PG (ref 27–31)
MCHC RBC AUTO-ENTMCNC: 32.5 G/DL (ref 32–36)
MCV RBC AUTO: 92 FL (ref 82–98)
MONOCYTES # BLD AUTO: 0.6 K/UL (ref 0.3–1)
MONOCYTES NFR BLD: 7.4 % (ref 4–15)
NEUTROPHILS # BLD AUTO: 4.3 K/UL (ref 1.8–7.7)
NEUTROPHILS NFR BLD: 49.3 % (ref 38–73)
NRBC BLD-RTO: 0 /100 WBC
PLATELET # BLD AUTO: 356 K/UL (ref 150–450)
PMV BLD AUTO: 9.4 FL (ref 9.2–12.9)
POTASSIUM SERPL-SCNC: 4.5 MMOL/L (ref 3.5–5.1)
PROT SERPL-MCNC: 7.5 G/DL (ref 6–8.4)
RBC # BLD AUTO: 4.45 M/UL (ref 4–5.4)
SODIUM SERPL-SCNC: 141 MMOL/L (ref 136–145)
TSH SERPL DL<=0.005 MIU/L-ACNC: 1.8 UIU/ML (ref 0.4–4)
VIT B12 SERPL-MCNC: 855 PG/ML (ref 210–950)
WBC # BLD AUTO: 8.69 K/UL (ref 3.9–12.7)

## 2023-10-17 PROCEDURE — 36415 COLL VENOUS BLD VENIPUNCTURE: CPT | Performed by: PHYSICIAN ASSISTANT

## 2023-10-17 PROCEDURE — 85025 COMPLETE CBC W/AUTO DIFF WBC: CPT | Performed by: PHYSICIAN ASSISTANT

## 2023-10-17 PROCEDURE — 99214 OFFICE O/P EST MOD 30 MIN: CPT | Mod: S$GLB,,, | Performed by: PHYSICIAN ASSISTANT

## 2023-10-17 PROCEDURE — 99214 PR OFFICE/OUTPT VISIT, EST, LEVL IV, 30-39 MIN: ICD-10-PCS | Mod: S$GLB,,, | Performed by: PHYSICIAN ASSISTANT

## 2023-10-17 PROCEDURE — 80053 COMPREHEN METABOLIC PANEL: CPT | Performed by: PHYSICIAN ASSISTANT

## 2023-10-17 PROCEDURE — 82607 VITAMIN B-12: CPT | Performed by: PHYSICIAN ASSISTANT

## 2023-10-17 PROCEDURE — 82306 VITAMIN D 25 HYDROXY: CPT | Performed by: PHYSICIAN ASSISTANT

## 2023-10-17 PROCEDURE — 84443 ASSAY THYROID STIM HORMONE: CPT | Performed by: PHYSICIAN ASSISTANT

## 2023-10-17 PROCEDURE — 99999 PR PBB SHADOW E&M-EST. PATIENT-LVL IV: ICD-10-PCS | Mod: PBBFAC,,, | Performed by: PHYSICIAN ASSISTANT

## 2023-10-17 PROCEDURE — 99999 PR PBB SHADOW E&M-EST. PATIENT-LVL IV: CPT | Mod: PBBFAC,,, | Performed by: PHYSICIAN ASSISTANT

## 2023-10-17 RX ORDER — CYCLOBENZAPRINE HCL 10 MG
10 TABLET ORAL NIGHTLY
Qty: 10 TABLET | Refills: 0 | Status: SHIPPED | OUTPATIENT
Start: 2023-10-17 | End: 2023-11-16

## 2023-10-17 RX ORDER — SPIRONOLACTONE 100 MG/1
100 TABLET, FILM COATED ORAL NIGHTLY
COMMUNITY
Start: 2023-10-14

## 2023-10-17 NOTE — PROGRESS NOTES
"Subjective:       Patient ID: Natividad Zaldivar is a 28 y.o. female.        Chief Complaint: Fatigue and Headache    Natividad Zaldivar is an established patient of No, Primary Doctor here today for urgent care visit.    Beginning of October she had URI sx.  Multiple negative Covid tests, at least 3.  Tx with prednisone, zpak.  Felt better for at least a week.  Starting last week she began having severe fatigue by 3 PM.  Then began getting headaches.  Feeling of pressure in middle of forehead and frontal sinus area.  Fairly constant headache.  No caffeine changes.  She wondered if it could be due to low potassium so she has been eating 2 bananas daily.  She has been sleeping longer due to fatigue and taking naps.  Wakes up with headache.  Severity of headache varies.  Afternoons are typically worse.  No blurred vision.  Wears glasses.  No numbness, tingling, or weakness.  Feels lots of tension and spasm in neck.  Took advil once and it was helpful.  Last week she was so tired and felt "on autopilot" like "I can't think clearly."  That has resolved this week.      +photophobia   -phonophobia  +nausea but no vomiting    Fatty liver, hepatomegaly -   Followed by hepatology  To follow yearly    Acne -  [facility] [address]  Recently started spironolactone 100 mg daily - started 2 days ago, headaches came first  Dr. Snider    H/o epiploic appendagitis - saw general surgery, tx with ibuprofen and all sx resolved, bowels now moving normally     BMI 37           Review of Systems   Constitutional:  Positive for activity change and fatigue. Negative for chills, diaphoresis, fever and unexpected weight change.   HENT:  Negative for congestion, hearing loss, rhinorrhea, sore throat and trouble swallowing.    Eyes:  Negative for discharge and visual disturbance.   Respiratory:  Negative for cough, chest tightness, shortness of breath and wheezing.    Cardiovascular:  Negative for chest pain, palpitations and leg swelling. "   Gastrointestinal:  Negative for abdominal pain, blood in stool, constipation, diarrhea, nausea and vomiting.   Endocrine: Negative for polydipsia and polyuria.   Genitourinary:  Negative for difficulty urinating, dysuria, frequency, hematuria, menstrual problem and urgency.   Musculoskeletal:  Negative for arthralgias, back pain, joint swelling and neck pain.   Skin:  Negative for rash.   Neurological:  Positive for headaches. Negative for dizziness, syncope and weakness.   Psychiatric/Behavioral:  Negative for confusion, dysphoric mood and sleep disturbance. The patient is not nervous/anxious.        Objective:      Physical Exam  Vitals and nursing note reviewed.   Constitutional:       Appearance: Normal appearance. She is well-developed.   HENT:      Head: Normocephalic.      Right Ear: Tympanic membrane and external ear normal.      Left Ear: Tympanic membrane and external ear normal.      Nose: No mucosal edema or rhinorrhea.      Mouth/Throat:      Pharynx: Oropharynx is clear.   Eyes:      Pupils: Pupils are equal, round, and reactive to light.   Cardiovascular:      Rate and Rhythm: Normal rate and regular rhythm.      Heart sounds: Normal heart sounds. No murmur heard.     No friction rub. No gallop.   Pulmonary:      Effort: Pulmonary effort is normal. No respiratory distress.      Breath sounds: Normal breath sounds.   Abdominal:      Palpations: Abdomen is soft.      Tenderness: There is no abdominal tenderness.   Skin:     General: Skin is warm and dry.   Neurological:      Mental Status: She is alert.      Cranial Nerves: Cranial nerves 2-12 are intact.      Sensory: Sensation is intact.      Motor: Motor function is intact.      Coordination: Coordination is intact.      Gait: Gait is intact.      Deep Tendon Reflexes:      Reflex Scores:       Patellar reflexes are 2+ on the right side and 2+ on the left side.        Assessment:       1. Fatigue, unspecified type    2. Neck pain    3.  "Nonintractable headache, unspecified chronicity pattern, unspecified headache type        Plan:       Natividad was seen today for fatigue and headache.    Diagnoses and all orders for this visit:    Fatigue, unspecified type  -     CBC Auto Differential; Future  -     Comprehensive Metabolic Panel; Future  -     TSH; Future  -     Vitamin B12; Future  -     Vitamin D; Future    Neck pain  -     cyclobenzaprine (FLEXERIL) 10 MG tablet; Take 1 tablet (10 mg total) by mouth every evening. for 10 days    Nonintractable headache, unspecified chronicity pattern, unspecified headache type    Check lab work  Trial of flexeril to help spasm and improve headache  If not resolving, consider MRI and neuro consult    Pt has been given instructions populated from patient instructions database and has verbalized understanding of the after visit summary and information contained wherein.    Follow up with a primary care provider. May go to ER for acute shortness of breath, lightheadedness, fever, or any other emergent complaints or changes in condition.    "This note will be shared with the patient"    Future Appointments   Date Time Provider Department Center   10/31/2023  7:30 AM Rosalva Saenz PA-C [facility]   7/1/2024  8:00 AM LAB, APPOINTMENT [facility] LAB  [facility]   7/11/2024  8:45 AM [facility] HEPATOLOGY, FIBROSCAN [facility] HEPAT [facility]   7/11/2024  9:00 AM Ananya Cesar NP [facility] HEPAT [facility]                 "

## 2023-10-31 ENCOUNTER — IMMUNIZATION (OUTPATIENT)
Dept: INTERNAL MEDICINE | Facility: CLINIC | Age: 28
End: 2023-10-31
Payer: COMMERCIAL

## 2023-10-31 ENCOUNTER — OFFICE VISIT (OUTPATIENT)
Dept: INTERNAL MEDICINE | Facility: CLINIC | Age: 28
End: 2023-10-31
Payer: COMMERCIAL

## 2023-10-31 VITALS
OXYGEN SATURATION: 97 % | DIASTOLIC BLOOD PRESSURE: 76 MMHG | SYSTOLIC BLOOD PRESSURE: 118 MMHG | HEART RATE: 64 BPM | HEIGHT: 62 IN | BODY MASS INDEX: 38.22 KG/M2 | WEIGHT: 207.69 LBS

## 2023-10-31 DIAGNOSIS — R53.83 FATIGUE, UNSPECIFIED TYPE: ICD-10-CM

## 2023-10-31 DIAGNOSIS — E66.09 CLASS 2 OBESITY DUE TO EXCESS CALORIES WITHOUT SERIOUS COMORBIDITY WITH BODY MASS INDEX (BMI) OF 36.0 TO 36.9 IN ADULT: ICD-10-CM

## 2023-10-31 DIAGNOSIS — K76.0 FATTY LIVER: ICD-10-CM

## 2023-10-31 DIAGNOSIS — R51.9 NONINTRACTABLE HEADACHE, UNSPECIFIED CHRONICITY PATTERN, UNSPECIFIED HEADACHE TYPE: Primary | ICD-10-CM

## 2023-10-31 PROCEDURE — 99214 PR OFFICE/OUTPT VISIT, EST, LEVL IV, 30-39 MIN: ICD-10-PCS | Mod: S$GLB,,, | Performed by: PHYSICIAN ASSISTANT

## 2023-10-31 PROCEDURE — 99999 PR PBB SHADOW E&M-EST. PATIENT-LVL III: CPT | Mod: PBBFAC,,, | Performed by: PHYSICIAN ASSISTANT

## 2023-10-31 PROCEDURE — 90686 FLU VACCINE (QUAD) GREATER THAN OR EQUAL TO 3YO PRESERVATIVE FREE IM: ICD-10-PCS | Mod: S$GLB,,, | Performed by: INTERNAL MEDICINE

## 2023-10-31 PROCEDURE — 99214 OFFICE O/P EST MOD 30 MIN: CPT | Mod: S$GLB,,, | Performed by: PHYSICIAN ASSISTANT

## 2023-10-31 PROCEDURE — 90471 FLU VACCINE (QUAD) GREATER THAN OR EQUAL TO 3YO PRESERVATIVE FREE IM: ICD-10-PCS | Mod: S$GLB,,, | Performed by: INTERNAL MEDICINE

## 2023-10-31 PROCEDURE — 99999 PR PBB SHADOW E&M-EST. PATIENT-LVL III: ICD-10-PCS | Mod: PBBFAC,,, | Performed by: PHYSICIAN ASSISTANT

## 2023-10-31 PROCEDURE — 90471 IMMUNIZATION ADMIN: CPT | Mod: S$GLB,,, | Performed by: INTERNAL MEDICINE

## 2023-10-31 PROCEDURE — 90686 IIV4 VACC NO PRSV 0.5 ML IM: CPT | Mod: S$GLB,,, | Performed by: INTERNAL MEDICINE

## 2023-10-31 NOTE — PATIENT INSTRUCTIONS
Kirill Henson,     If you are due for any health screening(s) below please notify me so we can arrange them to be ordered and scheduled. Most healthy patients at your age complete them, but you are free to accept or refuse.     If you can't do it, I'll definitely understand. If you can, I'd certainly appreciate it!    All of your core healthy metrics are met.

## 2023-10-31 NOTE — PROGRESS NOTES
Subjective:       Patient ID: Natividad Zaldivar is a 28 y.o. female.        Chief Complaint: Headache (Follow up)    Natividad Zaldivar is an established patient of , Primary Doctor here today for follow up visit.    Seen 2 weeks ago for headache and fatigue.  She has been tracking headaches and all has resolved within the past week.  She feels flexeril really helped and also adjusting her screens at work to have less blue light.  She has also gotten good quality sleep and fatigue has improved.      She will be establishing care with Dr. Quiros in a few weeks.    Fatty liver, hepatomegaly -   Followed by hepatology  To follow yearly     Acne -  [facility] [address]  Recently started spironolactone 100 mg daily - started 2 days ago, headaches came first  Dr. Snider     H/o epiploic appendagitis - saw general surgery, tx with ibuprofen and all sx resolved, bowels now moving normally      BMI 37                  Review of Systems   Constitutional:  Negative for chills, diaphoresis, fatigue and fever.   HENT:  Negative for congestion and sore throat.    Eyes:  Negative for visual disturbance.   Respiratory:  Negative for cough, chest tightness and shortness of breath.    Cardiovascular:  Negative for chest pain, palpitations and leg swelling.   Gastrointestinal:  Negative for abdominal pain, blood in stool, constipation, diarrhea, nausea and vomiting.   Genitourinary:  Negative for dysuria, frequency, hematuria and urgency.   Musculoskeletal:  Negative for arthralgias and back pain.   Skin:  Negative for rash.   Neurological:  Negative for dizziness, syncope, weakness and headaches.   Psychiatric/Behavioral:  Negative for dysphoric mood and sleep disturbance. The patient is not nervous/anxious.        Objective:      Physical Exam  Vitals and nursing note reviewed.   Constitutional:       Appearance: Normal appearance. She is well-developed.   HENT:      Head: Normocephalic.      Right Ear: External ear normal.       "Left Ear: External ear normal.   Eyes:      Pupils: Pupils are equal, round, and reactive to light.   Cardiovascular:      Rate and Rhythm: Normal rate and regular rhythm.      Heart sounds: Normal heart sounds. No murmur heard.     No friction rub. No gallop.   Pulmonary:      Effort: Pulmonary effort is normal. No respiratory distress.      Breath sounds: Normal breath sounds.   Abdominal:      Palpations: Abdomen is soft.      Tenderness: There is no abdominal tenderness.   Skin:     General: Skin is warm and dry.   Neurological:      Mental Status: She is alert.         Assessment:       1. Nonintractable headache, unspecified chronicity pattern, unspecified headache type    2. Fatigue, unspecified type    3. Class 2 obesity due to excess calories without serious comorbidity with body mass index (BMI) of 36.0 to 36.9 in adult    4. Fatty liver - seen on CT 4/2022        Plan:       Natividad was seen today for headache.    Diagnoses and all orders for this visit:    Nonintractable headache, unspecified chronicity pattern, unspecified headache type - headaches have resolved, continue to track, may use flexeril prn    Fatigue, unspecified type - resolved with getting good sleep, monitor    Class 2 obesity due to excess calories without serious comorbidity with body mass index (BMI) of 36.0 to 36.9 in adult - healthy diet, exercise, weight loss    Fatty liver - seen on CT 4/2022 - she has upcoming appointment with hepatology    F/u as scheduled in 2 weeks  Flu shot today    Pt has been given instructions populated from patient instructions database and has verbalized understanding of the after visit summary and information contained wherein.    Follow up with a primary care provider. May go to ER for acute shortness of breath, lightheadedness, fever, or any other emergent complaints or changes in condition.    "This note will be shared with the patient"    Future Appointments   Date Time Provider Department Center "   11/16/2023  1:30 PM Angela Quiros MD [facility]   7/1/2024  8:00 AM [facility]  [facility]   7/11/2024  8:45 AM [facility] HEPATOLOGY, FIBROSCAN [facility] HEPAT [facility]   7/11/2024  9:00 AM Ananya Cesar NP [facility] HEPAT [facility]

## 2023-11-16 ENCOUNTER — LAB VISIT (OUTPATIENT)
Dept: LAB | Facility: HOSPITAL | Age: 28
End: 2023-11-16
Attending: INTERNAL MEDICINE
Payer: COMMERCIAL

## 2023-11-16 ENCOUNTER — OFFICE VISIT (OUTPATIENT)
Dept: INTERNAL MEDICINE | Facility: CLINIC | Age: 28
End: 2023-11-16
Payer: COMMERCIAL

## 2023-11-16 VITALS
WEIGHT: 206.88 LBS | HEART RATE: 80 BPM | OXYGEN SATURATION: 98 % | SYSTOLIC BLOOD PRESSURE: 126 MMHG | HEIGHT: 62 IN | BODY MASS INDEX: 38.07 KG/M2 | DIASTOLIC BLOOD PRESSURE: 86 MMHG

## 2023-11-16 DIAGNOSIS — Z00.00 HEALTH CARE MAINTENANCE: ICD-10-CM

## 2023-11-16 DIAGNOSIS — Z00.00 HEALTH CARE MAINTENANCE: Primary | ICD-10-CM

## 2023-11-16 LAB
ANION GAP SERPL CALC-SCNC: 10 MMOL/L (ref 8–16)
BUN SERPL-MCNC: 10 MG/DL (ref 6–20)
CALCIUM SERPL-MCNC: 10.2 MG/DL (ref 8.7–10.5)
CHLORIDE SERPL-SCNC: 104 MMOL/L (ref 95–110)
CO2 SERPL-SCNC: 23 MMOL/L (ref 23–29)
CREAT SERPL-MCNC: 0.9 MG/DL (ref 0.5–1.4)
EST. GFR  (NO RACE VARIABLE): >60 ML/MIN/1.73 M^2
GLUCOSE SERPL-MCNC: 97 MG/DL (ref 70–110)
HIV 1+2 AB+HIV1 P24 AG SERPL QL IA: NORMAL
POTASSIUM SERPL-SCNC: 4 MMOL/L (ref 3.5–5.1)
SODIUM SERPL-SCNC: 137 MMOL/L (ref 136–145)

## 2023-11-16 PROCEDURE — 87389 HIV-1 AG W/HIV-1&-2 AB AG IA: CPT | Performed by: INTERNAL MEDICINE

## 2023-11-16 PROCEDURE — 99999 PR PBB SHADOW E&M-EST. PATIENT-LVL III: CPT | Mod: PBBFAC,,, | Performed by: INTERNAL MEDICINE

## 2023-11-16 PROCEDURE — 36415 COLL VENOUS BLD VENIPUNCTURE: CPT | Performed by: INTERNAL MEDICINE

## 2023-11-16 PROCEDURE — 99395 PR PREVENTIVE VISIT,EST,18-39: ICD-10-PCS | Mod: S$GLB,,, | Performed by: INTERNAL MEDICINE

## 2023-11-16 PROCEDURE — 99395 PREV VISIT EST AGE 18-39: CPT | Mod: S$GLB,,, | Performed by: INTERNAL MEDICINE

## 2023-11-16 PROCEDURE — 80048 BASIC METABOLIC PNL TOTAL CA: CPT | Performed by: INTERNAL MEDICINE

## 2023-11-16 PROCEDURE — 99999 PR PBB SHADOW E&M-EST. PATIENT-LVL III: ICD-10-PCS | Mod: PBBFAC,,, | Performed by: INTERNAL MEDICINE

## 2023-11-16 NOTE — PROGRESS NOTES
"Subjective:       Patient ID: Natividad Zaldivar is a 28 y.o. female.    Chief Complaint: Establish Care    HPI  Natividad Zaldivar is a 28 y.o. female here to establish care with a yearly preventative healthcare visit.     Ramona  Spironolactone 100mg    Fatty liver, hepatomegaly -   Followed by hepatology  To follow yearly     Acne -  [facility] [address]  Recently started spironolactone 100 mg daily - started 2 days ago, headaches came first  Dr. Snider     H/o epiploic appendagitis - saw general surgery, tx with ibuprofen and all sx resolved, bowels now moving normally      Headaches  Changed blue light settings on screens and this helped a lot.   Fatigue better.     BMI 37  Working on weight loss.  Poor diet when lived in [place]. Lost weight down to 176 lbs but gained it back.  Working on it again.   Recently started again doing cardio and walking on incline, sometimes weights.     Dad  this summer due following stroke during surgery for esophageal cancer.     Review of Systems   Constitutional:  Negative for fever.   Respiratory:  Negative for shortness of breath.    Cardiovascular:  Negative for chest pain.   Musculoskeletal: Negative.    Skin: Negative.        Objective:   /86 (BP Location: Left arm, Patient Position: Sitting, BP Method: Medium (Manual))   Pulse 80   Ht 5' 2" (1.575 m)   Wt 93.9 kg (206 lb 14.4 oz)   LMP 10/01/2023 (Exact Date)   SpO2 98%   BMI 37.84 kg/m²      Physical Exam  Constitutional:       General: She is not in acute distress.     Appearance: She is well-developed. She is not diaphoretic.   HENT:      Head: Normocephalic and atraumatic.   Cardiovascular:      Rate and Rhythm: Normal rate and regular rhythm.   Pulmonary:      Effort: Pulmonary effort is normal. No respiratory distress.      Breath sounds: No wheezing or rales.   Skin:     General: Skin is warm and dry.   Neurological:      Mental Status: She is alert and oriented to person, place, and time. "   Psychiatric:         Behavior: Behavior normal.         Assessment:       1. Health care maintenance        Plan:       1. Health care maintenance  -     Basic Metabolic Panel; Future; Expected date: 11/16/2023  -     HIV 1/2 Ag/Ab (4th Gen); Future; Expected date: 11/16/2023  Plan for baseline mmg at 35    BMI 37  Working on weight loss         Health Maintenance Due   Topic    Cervical Cancer Screening       Lab today

## 2023-12-11 ENCOUNTER — PATIENT MESSAGE (OUTPATIENT)
Dept: ADMINISTRATIVE | Facility: HOSPITAL | Age: 28
End: 2023-12-11
Payer: COMMERCIAL

## 2024-01-19 DIAGNOSIS — Z30.41 ENCOUNTER FOR SURVEILLANCE OF CONTRACEPTIVE PILLS: ICD-10-CM

## 2024-01-19 NOTE — TELEPHONE ENCOUNTER
Refill Routing Note   Medication(s) are not appropriate for processing by [facility] for the following reason(s):        Drug-disease interaction    [facility] action(s):  Defer        Medication Therapy Plan: Drug-Disease: drospirenone-ethinyl estradioL and Class 2 obesity due to excess calories without serious comorbidity with body mass index (BMI) of 36.0 to 36.9 in adult; DDI:drospirenone-ethinyl estradiol and Fatty Liver ( Overridden by Susan Peñaloza MD on Feb 15, 2023)    Pharmacist review requested: Yes     Appointments  past 12m or future 3m with PCP    Date Provider   Last Visit   2/15/2023 Susan Peñaloza MD   Next Visit   Visit date not found Susan Peñaloza MD   ED visits in past 90 days: 0        Note composed:12:28 PM 01/19/2024

## 2024-01-20 RX ORDER — DROSPIRENONE AND ETHINYL ESTRADIOL 0.02-3(28)
1 KIT ORAL
Qty: 84 TABLET | Refills: 0 | Status: SHIPPED | OUTPATIENT
Start: 2024-01-20 | End: 2024-04-14

## 2024-01-20 NOTE — TELEPHONE ENCOUNTER
Refill Decision Note   Natividad Castrejonraven  is requesting a refill authorization.  Brief Assessment and Rationale for Refill:  Approve     Medication Therapy Plan:         Pharmacist review requested: Yes   Comments:     Note composed:6:31 AM 01/20/2024

## 2024-02-15 ENCOUNTER — PATIENT OUTREACH (OUTPATIENT)
Dept: ADMINISTRATIVE | Facility: HOSPITAL | Age: 29
End: 2024-02-15
Payer: COMMERCIAL

## 2024-02-15 NOTE — PROGRESS NOTES

## 2024-02-29 ENCOUNTER — OFFICE VISIT (OUTPATIENT)
Dept: INTERNAL MEDICINE | Facility: CLINIC | Age: 29
End: 2024-02-29
Payer: COMMERCIAL

## 2024-02-29 VITALS
BODY MASS INDEX: 38.25 KG/M2 | HEART RATE: 87 BPM | HEIGHT: 62 IN | OXYGEN SATURATION: 98 % | DIASTOLIC BLOOD PRESSURE: 80 MMHG | WEIGHT: 207.88 LBS | SYSTOLIC BLOOD PRESSURE: 124 MMHG

## 2024-02-29 DIAGNOSIS — K76.0 FATTY LIVER: ICD-10-CM

## 2024-02-29 DIAGNOSIS — Z00.00 HEALTH CARE MAINTENANCE: Primary | ICD-10-CM

## 2024-02-29 DIAGNOSIS — K21.9 GASTROESOPHAGEAL REFLUX DISEASE, UNSPECIFIED WHETHER ESOPHAGITIS PRESENT: ICD-10-CM

## 2024-02-29 DIAGNOSIS — Z80.0 FAMILY HISTORY OF ESOPHAGEAL CANCER: ICD-10-CM

## 2024-02-29 PROCEDURE — 99999 PR PBB SHADOW E&M-EST. PATIENT-LVL IV: CPT | Mod: PBBFAC,,, | Performed by: PHYSICIAN ASSISTANT

## 2024-02-29 PROCEDURE — 99395 PREV VISIT EST AGE 18-39: CPT | Mod: S$GLB,,, | Performed by: PHYSICIAN ASSISTANT

## 2024-02-29 NOTE — PROGRESS NOTES
Subjective:       Patient ID: Natividad Zaldivar is a 28 y.o. female.        Chief Complaint: Annual Exam    Natividad Zaldivar is an established patient of Angela Quiros MD here today for annual exam.    BMI 38 -   Noom x 4 months with no weight loss, frustrated  Walks about 2 miles/day  Tries to limit to 1200 calories/day    Acid reflux, has to eat by 4 PM otherwise symptoms when she lays down, burning, no meds, controls with diet, dad with either esophageal or stomach cancer and not sure which  Father  2023    Lots of different cancers in family, interested in possible genetics consult but not quite yet, wants to obtain more specificity on family history first    Fatty liver, hepatomegaly -   Followed by hepatology  To follow yearly     Acne -  [facility] [address]  Spironolactone 100 mg daily  Dr. Snider     H/o epiploic appendagitis - saw general surgery, tx with ibuprofen and all sx resolved, bowels now moving normally               Review of Systems   Constitutional:  Negative for activity change, chills, diaphoresis, fatigue, fever and unexpected weight change.   HENT:  Negative for congestion, hearing loss, rhinorrhea, sore throat and trouble swallowing.    Eyes:  Negative for discharge and visual disturbance.   Respiratory:  Negative for cough, chest tightness, shortness of breath and wheezing.    Cardiovascular:  Negative for chest pain, palpitations and leg swelling.   Gastrointestinal:  Negative for abdominal pain, blood in stool, constipation, diarrhea, nausea and vomiting.        Reflux sx   Endocrine: Negative for polydipsia and polyuria.   Genitourinary:  Negative for difficulty urinating, dysuria, frequency, hematuria, menstrual problem and urgency.   Musculoskeletal:  Negative for arthralgias, back pain, joint swelling and neck pain.   Skin:  Negative for rash.   Neurological:  Negative for dizziness, syncope, weakness and headaches.   Psychiatric/Behavioral:  Negative for confusion,  dysphoric mood and sleep disturbance. The patient is not nervous/anxious.        Objective:      Physical Exam  Vitals and nursing note reviewed.   Constitutional:       Appearance: Normal appearance. She is well-developed.   HENT:      Head: Normocephalic.      Right Ear: Tympanic membrane and external ear normal.      Left Ear: Tympanic membrane and external ear normal.      Nose: No mucosal edema or rhinorrhea.      Mouth/Throat:      Pharynx: Oropharynx is clear.   Eyes:      Pupils: Pupils are equal, round, and reactive to light.   Cardiovascular:      Rate and Rhythm: Normal rate and regular rhythm.      Heart sounds: Normal heart sounds. No murmur heard.     No friction rub. No gallop.   Pulmonary:      Effort: Pulmonary effort is normal. No respiratory distress.      Breath sounds: Normal breath sounds.   Abdominal:      Palpations: Abdomen is soft.      Tenderness: There is no abdominal tenderness.   Skin:     General: Skin is warm and dry.   Neurological:      Mental Status: She is alert.         Assessment:       1. Health care maintenance    2. Gastroesophageal reflux disease, unspecified whether esophagitis present    3. Family history of esophageal cancer    4. Fatty liver - seen on CT 4/2022        Plan:       Natividad was seen today for annual exam.    Diagnoses and all orders for this visit:    Health care maintenance  -     CBC Auto Differential; Future  -     Comprehensive Metabolic Panel; Future  -     Hemoglobin A1C; Future  -     Lipid Panel; Future  -     TSH; Future    Gastroesophageal reflux disease, unspecified whether esophagitis present  -     Ambulatory referral/consult to Gastroenterology; Future    Family history of esophageal cancer  -     Ambulatory referral/consult to Gastroenterology; Future    Fatty liver - seen on CT 4/2022    Schedule fasting lab work    Pt has been given instructions populated from patient instructions database and has verbalized understanding of the after visit  "summary and information contained wherein.    Follow up with a primary care provider. May go to ER for acute shortness of breath, lightheadedness, fever, or any other emergent complaints or changes in condition.    "This note will be shared with the patient"    Future Appointments   Date Time Provider Department Center   3/7/2024 12:30 PM Alexandria Nuñez MD [facility] [place]   6/28/2024  9:45 AM Susan Peñaloza MD [facility] [place]   7/1/2024  8:00 AM LAB, APPOINTMENT [facility] LAB  [facility]   7/11/2024  8:45 AM [facility] HEPATOLOGY, FIBROSCAN [facility] HEPAT [facility]   7/11/2024  9:00 AM Ananya Cesar NP [facility] HEPAT [facility]                 "

## 2024-03-04 ENCOUNTER — LAB VISIT (OUTPATIENT)
Dept: LAB | Facility: HOSPITAL | Age: 29
End: 2024-03-04
Payer: COMMERCIAL

## 2024-03-04 DIAGNOSIS — Z00.00 HEALTH CARE MAINTENANCE: ICD-10-CM

## 2024-03-04 LAB
ALBUMIN SERPL BCP-MCNC: 3.7 G/DL (ref 3.5–5.2)
ALP SERPL-CCNC: 61 U/L (ref 55–135)
ALT SERPL W/O P-5'-P-CCNC: 18 U/L (ref 10–44)
ANION GAP SERPL CALC-SCNC: 11 MMOL/L (ref 8–16)
AST SERPL-CCNC: 15 U/L (ref 10–40)
BASOPHILS # BLD AUTO: 0.05 K/UL (ref 0–0.2)
BASOPHILS NFR BLD: 0.5 % (ref 0–1.9)
BILIRUB SERPL-MCNC: 0.3 MG/DL (ref 0.1–1)
BUN SERPL-MCNC: 14 MG/DL (ref 6–20)
CALCIUM SERPL-MCNC: 9.9 MG/DL (ref 8.7–10.5)
CHLORIDE SERPL-SCNC: 105 MMOL/L (ref 95–110)
CHOLEST SERPL-MCNC: 241 MG/DL (ref 120–199)
CHOLEST/HDLC SERPL: 4.5 {RATIO} (ref 2–5)
CO2 SERPL-SCNC: 23 MMOL/L (ref 23–29)
CREAT SERPL-MCNC: 0.8 MG/DL (ref 0.5–1.4)
DIFFERENTIAL METHOD BLD: NORMAL
EOSINOPHIL # BLD AUTO: 0.2 K/UL (ref 0–0.5)
EOSINOPHIL NFR BLD: 1.9 % (ref 0–8)
ERYTHROCYTE [DISTWIDTH] IN BLOOD BY AUTOMATED COUNT: 12.5 % (ref 11.5–14.5)
EST. GFR  (NO RACE VARIABLE): >60 ML/MIN/1.73 M^2
ESTIMATED AVG GLUCOSE: 97 MG/DL (ref 68–131)
GLUCOSE SERPL-MCNC: 85 MG/DL (ref 70–110)
HBA1C MFR BLD: 5 % (ref 4–5.6)
HCT VFR BLD AUTO: 39.9 % (ref 37–48.5)
HDLC SERPL-MCNC: 53 MG/DL (ref 40–75)
HDLC SERPL: 22 % (ref 20–50)
HGB BLD-MCNC: 13.2 G/DL (ref 12–16)
IMM GRANULOCYTES # BLD AUTO: 0.02 K/UL (ref 0–0.04)
IMM GRANULOCYTES NFR BLD AUTO: 0.2 % (ref 0–0.5)
LDLC SERPL CALC-MCNC: 121.2 MG/DL (ref 63–159)
LYMPHOCYTES # BLD AUTO: 4.2 K/UL (ref 1–4.8)
LYMPHOCYTES NFR BLD: 41.5 % (ref 18–48)
MCH RBC QN AUTO: 30.3 PG (ref 27–31)
MCHC RBC AUTO-ENTMCNC: 33.1 G/DL (ref 32–36)
MCV RBC AUTO: 92 FL (ref 82–98)
MONOCYTES # BLD AUTO: 0.6 K/UL (ref 0.3–1)
MONOCYTES NFR BLD: 5.6 % (ref 4–15)
NEUTROPHILS # BLD AUTO: 5.1 K/UL (ref 1.8–7.7)
NEUTROPHILS NFR BLD: 50.3 % (ref 38–73)
NONHDLC SERPL-MCNC: 188 MG/DL
NRBC BLD-RTO: 0 /100 WBC
PLATELET # BLD AUTO: 354 K/UL (ref 150–450)
PMV BLD AUTO: 9.4 FL (ref 9.2–12.9)
POTASSIUM SERPL-SCNC: 4.2 MMOL/L (ref 3.5–5.1)
PROT SERPL-MCNC: 7.3 G/DL (ref 6–8.4)
RBC # BLD AUTO: 4.36 M/UL (ref 4–5.4)
SODIUM SERPL-SCNC: 139 MMOL/L (ref 136–145)
TRIGL SERPL-MCNC: 334 MG/DL (ref 30–150)
TSH SERPL DL<=0.005 MIU/L-ACNC: 2.56 UIU/ML (ref 0.4–4)
WBC # BLD AUTO: 10.09 K/UL (ref 3.9–12.7)

## 2024-03-04 PROCEDURE — 84443 ASSAY THYROID STIM HORMONE: CPT | Performed by: PHYSICIAN ASSISTANT

## 2024-03-04 PROCEDURE — 80053 COMPREHEN METABOLIC PANEL: CPT | Performed by: PHYSICIAN ASSISTANT

## 2024-03-04 PROCEDURE — 85025 COMPLETE CBC W/AUTO DIFF WBC: CPT | Performed by: PHYSICIAN ASSISTANT

## 2024-03-04 PROCEDURE — 36415 COLL VENOUS BLD VENIPUNCTURE: CPT | Mod: PN | Performed by: PHYSICIAN ASSISTANT

## 2024-03-04 PROCEDURE — 80061 LIPID PANEL: CPT | Performed by: PHYSICIAN ASSISTANT

## 2024-03-04 PROCEDURE — 83036 HEMOGLOBIN GLYCOSYLATED A1C: CPT | Performed by: PHYSICIAN ASSISTANT

## 2024-03-07 ENCOUNTER — OFFICE VISIT (OUTPATIENT)
Dept: GASTROENTEROLOGY | Facility: CLINIC | Age: 29
End: 2024-03-07
Payer: COMMERCIAL

## 2024-03-07 VITALS
DIASTOLIC BLOOD PRESSURE: 82 MMHG | WEIGHT: 206.81 LBS | HEART RATE: 98 BPM | SYSTOLIC BLOOD PRESSURE: 114 MMHG | HEIGHT: 62 IN | BODY MASS INDEX: 38.06 KG/M2

## 2024-03-07 DIAGNOSIS — Z80.0 FAMILY HISTORY OF ESOPHAGEAL CANCER: ICD-10-CM

## 2024-03-07 DIAGNOSIS — K21.9 GASTROESOPHAGEAL REFLUX DISEASE, UNSPECIFIED WHETHER ESOPHAGITIS PRESENT: ICD-10-CM

## 2024-03-07 PROCEDURE — 99999 PR PBB SHADOW E&M-EST. PATIENT-LVL III: CPT | Mod: PBBFAC,,, | Performed by: STUDENT IN AN ORGANIZED HEALTH CARE EDUCATION/TRAINING PROGRAM

## 2024-03-07 PROCEDURE — 99204 OFFICE O/P NEW MOD 45 MIN: CPT | Mod: S$GLB,,, | Performed by: STUDENT IN AN ORGANIZED HEALTH CARE EDUCATION/TRAINING PROGRAM

## 2024-03-07 RX ORDER — OMEPRAZOLE 40 MG/1
40 CAPSULE, DELAYED RELEASE ORAL DAILY
Qty: 30 CAPSULE | Refills: 1 | Status: SHIPPED | OUTPATIENT
Start: 2024-03-07 | End: 2025-03-07

## 2024-03-07 NOTE — PATIENT INSTRUCTIONS
Recommend to take omeprazole on an empty stomach, 30 mins before meals     Avoid eating 3-4 hours before bed     Coffee, caffeine, chocolate, peppermint and alcohol are common reflux triggers

## 2024-03-07 NOTE — PROGRESS NOTES
"    [facility] Consultation Note    Reason for Consult:  Diagnoses of Gastroesophageal reflux disease, unspecified whether esophagitis present and Family history of esophageal cancer were pertinent to this visit.    PCP:   Angela Quiros   [address] [facility] [id number]    Referring MD:  Rosalva Saenz, [place]  [address] [place] [address]    HPI:  This is a 28 y.o. female here for evaluation of reflux and FH of esophageal cancer. She has been having intermittent reflux symptoms which she describes as a burning discomfort and regurgitation which occurs 3-4 times per week. Most often at night or if she eats too late. She does take advil at least once a week for headaches. Infrequent emesis. No abdominal pain, nausea, dysphagia, melena, hematemesis, weight loss or early satiety. She does not take any reflux meds except a PRN tums. She does admit to not drinking a lot of water and drinking a lot of coffee/caffeine during the day. Also if she eats spicy food this is a trigger. Has not previously been on PPI. No prior endoscopy. No recent abdominal imaging. Most recent blood work is unremarkable. Her father had esophageal cancer at 64 and he was not a smoker/drinker. No known genetic issues she is aware of. No Fh of CRC, gastric cancer, celiac or IBD. BM occur daily and sometimes 2-3 daily. Loose and mushy. No hard stools or straining. No blood. Occasional has a day or two without a BM.     She did have a CT in 2022 showed epiploic appendagitis and she was treated with high dose advil and improved. No surgery was needed.        Objective Findings:    Vital Signs:  /82   Pulse 98   Ht 5' 2" (1.575 m)   Wt 93.8 kg (206 lb 12.7 oz)   BMI 37.82 kg/m²   Body mass index is 37.82 kg/m².    Physical Exam:  General Appearance: Well appearing in no acute distress  Head:   Normocephalic, without obvious abnormality  Eyes:    No scleral icterus    Lungs: CTA bilaterally " in anterior and posterior fields   Heart:  Regular rate and rhythm, no murmurs heard  Abdomen: Soft, non tender, non distended with positive bowel sounds in all four quadrants.      Imaging:    CT 2022     Impression:     1. Findings compatible with epiploic appendagitis adjacent to the distal descending colon.  2. Hepatomegaly and hepatic steatosis.    Endoscopy:    None     Assessment:    Reflux   FH of esophageal cancer in 1st degree relative      Patient with reflux without alarm features but she does have a FH of esophageal cancer in her father. She does have intermittent reflux symptoms and uses NSAIDs regularly. Discussed stopping NSAIDs, checking HP stool and starting a PPI trial. If symptoms persist, plan for EGD.     Recommendations:    - HP stool testing   - Trial of PPI   - Plan for EGD for persistent symptoms  - Stop NSAIDs   - Cscope for CRC screening at 44 yo     RTC 3 months       Order summary:  Orders Placed This Encounter    H. pylori antigen, stool    omeprazole (PRILOSEC) 40 MG capsule         Thank you so much for allowing me to participate in the care of Natividad Nuñez MD  Gastroenterology   [facility]

## 2024-03-19 ENCOUNTER — TELEPHONE (OUTPATIENT)
Dept: INTERNAL MEDICINE | Facility: CLINIC | Age: 29
End: 2024-03-19
Payer: COMMERCIAL

## 2024-03-19 NOTE — TELEPHONE ENCOUNTER
Please call patient to review message as she did not check portal      Natividad,     Your triglycerides are much more elevated than they have been in the past.  Triglycerides usually respond to changes in diet and weight loss.  Avoid simple sugars in the diet to improve triglycerides.  We will continue to monitor.     Thyroid, sugar, kidney, and liver all look fine.     Please let me know if you have any additional questions or concerns.       Rosalva Green PA-C  [facility]  P: [phone]

## 2024-03-20 ENCOUNTER — TELEPHONE (OUTPATIENT)
Dept: INTERNAL MEDICINE | Facility: CLINIC | Age: 29
End: 2024-03-20
Payer: COMMERCIAL

## 2024-03-20 NOTE — TELEPHONE ENCOUNTER
----- Message from Trice Hua sent at 3/19/2024  4:23 PM CDT -----  Contact: [phone]  Pt is returning a call she missed from the office. Please  call.               Thank you

## 2024-04-13 DIAGNOSIS — Z30.41 ENCOUNTER FOR SURVEILLANCE OF CONTRACEPTIVE PILLS: ICD-10-CM

## 2024-04-14 RX ORDER — DROSPIRENONE AND ETHINYL ESTRADIOL 0.02-3(28)
1 KIT ORAL
Qty: 84 TABLET | Refills: 0 | Status: SHIPPED | OUTPATIENT
Start: 2024-04-14 | End: 2024-05-28

## 2024-04-14 NOTE — TELEPHONE ENCOUNTER
Refill Routing Note   Medication(s) are not appropriate for processing by [facility] for the following reason(s):        Drug-disease interaction    [facility] action(s):  Defer        Medication Therapy Plan: FOVS in 2 months; Drug-Disease: drospirenone-ethinyl estradioL and Class 2 obesity due to excess calories without serious comorbidity with body mass index (BMI) of 36.0 to 36.9 in adult; DDI: KHADIJAH and fatty liver      Appointments  past 12m or future 3m with PCP    Date Provider   Last Visit   2/15/2023 Susan Peñaloza MD   Next Visit   6/28/2024 Susan Peñaloza MD   ED visits in past 90 days: 0        Note composed:11:10 AM 04/14/2024

## 2024-05-28 DIAGNOSIS — Z30.41 ENCOUNTER FOR SURVEILLANCE OF CONTRACEPTIVE PILLS: ICD-10-CM

## 2024-05-28 RX ORDER — DROSPIRENONE AND ETHINYL ESTRADIOL 0.02-3(28)
1 KIT ORAL
Qty: 84 TABLET | Refills: 0 | Status: SHIPPED | OUTPATIENT
Start: 2024-05-28

## 2024-05-28 NOTE — TELEPHONE ENCOUNTER
Refill Routing Note   Medication(s) are not appropriate for processing by [facility] for the following reason(s):        Patient not seen by provider within 15 months    [facility] action(s):  Defer               Appointments  past 12m or future 3m with PCP    Date Provider   Last Visit   2/15/2023 Susan Peñaloza MD   Next Visit   8/29/2024 Susan Peñaloza MD   ED visits in past 90 days: 0        Note composed:12:19 PM 05/28/2024

## 2024-06-10 ENCOUNTER — PATIENT MESSAGE (OUTPATIENT)
Dept: INTERNAL MEDICINE | Facility: CLINIC | Age: 29
End: 2024-06-10
Payer: COMMERCIAL

## 2024-07-01 ENCOUNTER — LAB VISIT (OUTPATIENT)
Dept: LAB | Facility: HOSPITAL | Age: 29
End: 2024-07-01
Payer: COMMERCIAL

## 2024-07-01 DIAGNOSIS — K76.0 FATTY LIVER: ICD-10-CM

## 2024-07-01 LAB
ALBUMIN SERPL BCP-MCNC: 3.5 G/DL (ref 3.5–5.2)
ALP SERPL-CCNC: 60 U/L (ref 55–135)
ALT SERPL W/O P-5'-P-CCNC: 11 U/L (ref 10–44)
AST SERPL-CCNC: 11 U/L (ref 10–40)
BILIRUB DIRECT SERPL-MCNC: 0.1 MG/DL (ref 0.1–0.3)
BILIRUB SERPL-MCNC: 0.3 MG/DL (ref 0.1–1)
PROT SERPL-MCNC: 6.6 G/DL (ref 6–8.4)

## 2024-07-01 PROCEDURE — 86706 HEP B SURFACE ANTIBODY: CPT | Performed by: NURSE PRACTITIONER

## 2024-07-01 PROCEDURE — 80076 HEPATIC FUNCTION PANEL: CPT | Performed by: NURSE PRACTITIONER

## 2024-07-01 PROCEDURE — 36415 COLL VENOUS BLD VENIPUNCTURE: CPT | Performed by: NURSE PRACTITIONER

## 2024-07-05 LAB
HBV SURFACE AB SER QL IA: POSITIVE
HBV SURFACE AB SERPL IA-ACNC: 59 MIU/ML

## 2024-07-10 ENCOUNTER — PATIENT MESSAGE (OUTPATIENT)
Dept: HEPATOLOGY | Facility: CLINIC | Age: 29
End: 2024-07-10
Payer: COMMERCIAL

## 2024-08-07 ENCOUNTER — PROCEDURE VISIT (OUTPATIENT)
Dept: HEPATOLOGY | Facility: CLINIC | Age: 29
End: 2024-08-07
Payer: COMMERCIAL

## 2024-08-07 ENCOUNTER — OFFICE VISIT (OUTPATIENT)
Dept: HEPATOLOGY | Facility: CLINIC | Age: 29
End: 2024-08-07
Payer: COMMERCIAL

## 2024-08-07 VITALS — WEIGHT: 211 LBS | BODY MASS INDEX: 38.83 KG/M2 | HEIGHT: 62 IN

## 2024-08-07 DIAGNOSIS — K76.0 METABOLIC DYSFUNCTION-ASSOCIATED STEATOTIC LIVER DISEASE (MASLD): Primary | ICD-10-CM

## 2024-08-07 DIAGNOSIS — E66.01 CLASS 2 SEVERE OBESITY DUE TO EXCESS CALORIES WITH SERIOUS COMORBIDITY AND BODY MASS INDEX (BMI) OF 38.0 TO 38.9 IN ADULT: ICD-10-CM

## 2024-08-07 DIAGNOSIS — K76.0 FATTY LIVER: ICD-10-CM

## 2024-08-07 PROCEDURE — 99999 PR PBB SHADOW E&M-EST. PATIENT-LVL III: CPT | Mod: PBBFAC,,, | Performed by: NURSE PRACTITIONER

## 2024-08-07 PROCEDURE — 99214 OFFICE O/P EST MOD 30 MIN: CPT | Mod: S$GLB,,, | Performed by: NURSE PRACTITIONER

## 2024-08-07 PROCEDURE — 91200 LIVER ELASTOGRAPHY: CPT | Mod: S$GLB,,, | Performed by: NURSE PRACTITIONER

## 2024-08-26 ENCOUNTER — OFFICE VISIT (OUTPATIENT)
Dept: OBSTETRICS AND GYNECOLOGY | Facility: CLINIC | Age: 29
End: 2024-08-26
Attending: OBSTETRICS & GYNECOLOGY
Payer: COMMERCIAL

## 2024-08-26 ENCOUNTER — TELEPHONE (OUTPATIENT)
Dept: OBSTETRICS AND GYNECOLOGY | Facility: CLINIC | Age: 29
End: 2024-08-26
Payer: COMMERCIAL

## 2024-08-26 VITALS — HEIGHT: 62 IN | WEIGHT: 209.44 LBS | BODY MASS INDEX: 38.54 KG/M2

## 2024-08-26 DIAGNOSIS — Z12.4 ENCOUNTER FOR PAPANICOLAOU SMEAR FOR CERVICAL CANCER SCREENING: ICD-10-CM

## 2024-08-26 DIAGNOSIS — Z11.51 ENCOUNTER FOR SCREENING FOR HUMAN PAPILLOMAVIRUS (HPV): Primary | ICD-10-CM

## 2024-08-26 DIAGNOSIS — N90.89 VULVAR LESION: ICD-10-CM

## 2024-08-26 DIAGNOSIS — Z30.41 ENCOUNTER FOR SURVEILLANCE OF CONTRACEPTIVE PILLS: ICD-10-CM

## 2024-08-26 PROCEDURE — 87529 HSV DNA AMP PROBE: CPT | Performed by: OBSTETRICS & GYNECOLOGY

## 2024-08-26 PROCEDURE — 87624 HPV HI-RISK TYP POOLED RSLT: CPT | Performed by: OBSTETRICS & GYNECOLOGY

## 2024-08-26 PROCEDURE — 99999 PR PBB SHADOW E&M-EST. PATIENT-LVL III: CPT | Mod: PBBFAC,,, | Performed by: OBSTETRICS & GYNECOLOGY

## 2024-08-26 RX ORDER — DROSPIRENONE AND ETHINYL ESTRADIOL 0.02-3(28)
1 KIT ORAL DAILY
Qty: 84 TABLET | Refills: 3 | Status: SHIPPED | OUTPATIENT
Start: 2024-08-26

## 2024-08-26 NOTE — TELEPHONE ENCOUNTER
Pt started with pain over the weekend and found a lump on vulva about the size of the tip of her pinky.  States its red, black, and purple in color.  Painful to walk and sit.  She has an annual on Thursday, requesting to be seen sooner.  Moved appt to today.

## 2024-08-26 NOTE — PROGRESS NOTES
Subjective:       Patient ID: Natividad Zaldivar is a 29 y.o. female.    Chief Complaint:  vaginal lump        History of Present Illness  Natividad Zaldivar is a 29 y.o. female  who presents for annual. Overall doing well on OCP. Has a bump for a couple of days on her right labia that is red and purple and painful. Not recently sexually active for the last few years. No HSV. No trauma. Has not drained.     Patient's last menstrual period was 06/10/2024 (exact date).   Date of Last Pap: 2024    Review of Systems  Review of Systems   Constitutional:  Negative for chills and fever.        Objective:   Physical Exam:   Constitutional: She is oriented to person, place, and time. Vital signs are normal. She appears well-developed and well-nourished. No distress.        Pulmonary/Chest: She exhibits no mass. Right breast exhibits no mass, no nipple discharge, no skin change, no tenderness, no bleeding and no swelling. Left breast exhibits no mass, no nipple discharge, no skin change, no tenderness, no bleeding and no swelling. Breasts are symmetrical.        Abdominal: Soft. Bowel sounds are normal. She exhibits no distension and no mass. There is no abdominal tenderness. There is no rebound.     Genitourinary:    Vagina and uterus normal.         There is no rash, tenderness, lesion or injury on the right labia. There is no rash, tenderness, lesion or injury on the left labia. Cervix is normal. Right adnexum displays no mass, no tenderness and no fullness. Left adnexum displays no mass, no tenderness and no fullness. No erythema, vaginal discharge, tenderness, rectocele, cystocele or prolapse of vaginal walls in the vagina. Cervix exhibits no motion tenderness, no discharge and no friability. Uterus is not deviated, not enlarged, not fixed, not tender and not hosting fibroids.    Genitourinary Comments: One small lesion, tener to touch, HSV culture done although does not look like typical HSV.               Musculoskeletal: Normal range of motion and moves all extremeties.      Lymphadenopathy:        Right: No supraclavicular adenopathy present.        Left: No supraclavicular adenopathy present.    Neurological: She is alert and oriented to person, place, and time.    Skin: Skin is warm and dry.    Psychiatric: She has a normal mood and affect. Her behavior is normal. Judgment normal.        Assessment/ Plan:     1. Encounter for screening for human papillomavirus (HPV)  HPV High Risk Genotypes, PCR      2. Encounter for Papanicolaou smear for cervical cancer screening  Liquid-Based Pap Smear, Screening      3. Encounter for surveillance of contraceptive pills  drospirenone-ethinyl estradioL (KHADIJAH) 3-0.02 mg per tablet      4. Vulvar lesion  HSV by Rapid PCR, Non-Blood [facility]; Other (Specify) (Vulvar lesion)    HSV by Rapid PCR, Non-Blood [facility]; Other (Specify) (Vulvar lesion)        Cristi patels  Will follow up with culture  No follow-ups on file.    As of April 1, 2021, the Cures Act has been passed nationally. This new law requires that all doctors progress notes, lab results, pathology reports and radiology reports be released IMMEDIATELY to the patient in the patient portal. That means that the results are released to you at the EXACT same time they are released to me. Therefore, with all of the patients that I have I am not able to reply to each patient exactly when the results come in. So there will be a delay from when you see the results to when I see them and have time to come up with a response to send you. Also I only see these results when I am on the computer at work. So if the results come in over the weekend or after 5 pm of a work day, I will not see them until the next business day. As you can tell, this is a challenge as a physician to give every patient the quick response they hope for and deserve. So please be patient! Thanks for understanding, Dr. Peñaloza

## 2024-08-29 LAB
HSV1 DNA SPEC QL NAA+PROBE: NEGATIVE
HSV2 DNA SPEC QL NAA+PROBE: NEGATIVE
SPECIMEN SOURCE: NORMAL

## 2024-08-30 LAB
HPV HR 12 DNA SPEC QL NAA+PROBE: NEGATIVE
HPV16 AG SPEC QL: NEGATIVE
HPV18 DNA SPEC QL NAA+PROBE: NEGATIVE

## 2024-09-24 ENCOUNTER — PATIENT MESSAGE (OUTPATIENT)
Dept: PSYCHIATRY | Facility: CLINIC | Age: 29
End: 2024-09-24
Payer: COMMERCIAL

## 2024-09-30 ENCOUNTER — OFFICE VISIT (OUTPATIENT)
Dept: PSYCHIATRY | Facility: CLINIC | Age: 29
End: 2024-09-30
Payer: COMMERCIAL

## 2024-09-30 VITALS
HEART RATE: 91 BPM | HEIGHT: 62 IN | WEIGHT: 209.88 LBS | DIASTOLIC BLOOD PRESSURE: 87 MMHG | SYSTOLIC BLOOD PRESSURE: 137 MMHG | BODY MASS INDEX: 38.62 KG/M2

## 2024-09-30 DIAGNOSIS — F90.2 ADHD (ATTENTION DEFICIT HYPERACTIVITY DISORDER), COMBINED TYPE: Primary | ICD-10-CM

## 2024-09-30 PROCEDURE — 3044F HG A1C LEVEL LT 7.0%: CPT | Mod: CPTII,S$GLB,, | Performed by: NURSE PRACTITIONER

## 2024-09-30 PROCEDURE — 90792 PSYCH DIAG EVAL W/MED SRVCS: CPT | Mod: S$GLB,,, | Performed by: NURSE PRACTITIONER

## 2024-09-30 PROCEDURE — 3075F SYST BP GE 130 - 139MM HG: CPT | Mod: CPTII,S$GLB,, | Performed by: NURSE PRACTITIONER

## 2024-09-30 PROCEDURE — 99999 PR PBB SHADOW E&M-EST. PATIENT-LVL III: CPT | Mod: PBBFAC,,, | Performed by: NURSE PRACTITIONER

## 2024-09-30 PROCEDURE — 1159F MED LIST DOCD IN RCRD: CPT | Mod: CPTII,S$GLB,, | Performed by: NURSE PRACTITIONER

## 2024-09-30 PROCEDURE — 3079F DIAST BP 80-89 MM HG: CPT | Mod: CPTII,S$GLB,, | Performed by: NURSE PRACTITIONER

## 2024-09-30 PROCEDURE — 1160F RVW MEDS BY RX/DR IN RCRD: CPT | Mod: CPTII,S$GLB,, | Performed by: NURSE PRACTITIONER

## 2024-09-30 RX ORDER — LISDEXAMFETAMINE DIMESYLATE 20 MG/1
20 CAPSULE ORAL EVERY MORNING
Qty: 30 CAPSULE | Refills: 0 | Status: SHIPPED | OUTPATIENT
Start: 2024-09-30

## 2024-10-22 ENCOUNTER — OFFICE VISIT (OUTPATIENT)
Dept: OBSTETRICS AND GYNECOLOGY | Facility: CLINIC | Age: 29
End: 2024-10-22
Payer: COMMERCIAL

## 2024-10-22 VITALS
HEIGHT: 62 IN | SYSTOLIC BLOOD PRESSURE: 114 MMHG | DIASTOLIC BLOOD PRESSURE: 76 MMHG | HEART RATE: 73 BPM | BODY MASS INDEX: 38.9 KG/M2 | WEIGHT: 211.38 LBS

## 2024-10-22 DIAGNOSIS — E66.01 CLASS 2 SEVERE OBESITY DUE TO EXCESS CALORIES WITH SERIOUS COMORBIDITY AND BODY MASS INDEX (BMI) OF 38.0 TO 38.9 IN ADULT: ICD-10-CM

## 2024-10-22 DIAGNOSIS — E66.812 CLASS 2 SEVERE OBESITY DUE TO EXCESS CALORIES WITH SERIOUS COMORBIDITY AND BODY MASS INDEX (BMI) OF 38.0 TO 38.9 IN ADULT: ICD-10-CM

## 2024-10-22 DIAGNOSIS — E28.2 PCOS (POLYCYSTIC OVARIAN SYNDROME): Primary | ICD-10-CM

## 2024-10-22 DIAGNOSIS — K76.0 FATTY LIVER: ICD-10-CM

## 2024-10-22 PROCEDURE — 3074F SYST BP LT 130 MM HG: CPT | Mod: CPTII,S$GLB,, | Performed by: PHYSICIAN ASSISTANT

## 2024-10-22 PROCEDURE — 3078F DIAST BP <80 MM HG: CPT | Mod: CPTII,S$GLB,, | Performed by: PHYSICIAN ASSISTANT

## 2024-10-22 PROCEDURE — 3044F HG A1C LEVEL LT 7.0%: CPT | Mod: CPTII,S$GLB,, | Performed by: PHYSICIAN ASSISTANT

## 2024-10-22 PROCEDURE — 1160F RVW MEDS BY RX/DR IN RCRD: CPT | Mod: CPTII,S$GLB,, | Performed by: PHYSICIAN ASSISTANT

## 2024-10-22 PROCEDURE — 99214 OFFICE O/P EST MOD 30 MIN: CPT | Mod: S$GLB,,, | Performed by: PHYSICIAN ASSISTANT

## 2024-10-22 PROCEDURE — 3008F BODY MASS INDEX DOCD: CPT | Mod: CPTII,S$GLB,, | Performed by: PHYSICIAN ASSISTANT

## 2024-10-22 PROCEDURE — 99999 PR PBB SHADOW E&M-EST. PATIENT-LVL III: CPT | Mod: PBBFAC,,, | Performed by: PHYSICIAN ASSISTANT

## 2024-10-22 PROCEDURE — 1159F MED LIST DOCD IN RCRD: CPT | Mod: CPTII,S$GLB,, | Performed by: PHYSICIAN ASSISTANT

## 2024-10-22 RX ORDER — SEMAGLUTIDE 0.25 MG/.5ML
0.25 INJECTION, SOLUTION SUBCUTANEOUS
Qty: 2 ML | Refills: 3 | Status: SHIPPED | OUTPATIENT
Start: 2024-10-22 | End: 2024-10-23 | Stop reason: SDUPTHER

## 2024-10-22 NOTE — PROGRESS NOTES
Subjective:      Natividad Zaldivar is a 29 y.o. female who presents to discuss weight loss. History of PCOS currently on OCP and fatty liver. 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 She did start vyvanse about 3 weeks ago. She has noticed decreased appetite and cravings but not seeing weight loss. Stress is high with work and limited time to exercise. She is interested in starting a GLP-1. She is not currently sexually active. She would like to lose weight to improve over all health and reduce fatty liver. Goal of weight around 120-130lb    Routine Screening Labs: 3/4/2024    Sleep: getting about 6-8 hours a night     Stress: High due to work    Exercise: daily- walking for 30 minutes, weights and cardio at the gym 1x per week the most. Limited due to work     A typical day consists of (in the last 3 weeks since starting vyvanse):  Breakfast:  Taye egg bites with cinnamon toast  Lunch: apple and yogurt  Dinner: soup and salad with chicken or tofu  Snack: Rare  Beverages: water, coffee with almond milk. Alcohol- One glass of wine per week      Office Visit on 08/26/2024   Component Date Value Ref Range Status    Final Pathologic Diagnosis 08/26/2024    Final                    Value:Specimen Adequacy  Satisfactory for interpretation. Endocervical component is absent.    [place] Category  Negative for intraepithelial lesion or malignancy.      Disclaimer 08/26/2024    Final                    Value:The Pap smear is a screening test that aids in the detection of cervical cancer and cancer precursors. Both false positive and false negative results can occur. The test should be used at regular intervals, and positive results should be confirmed before   definitive therapy.  This liquid based specimen is processed using the  or  Thin PrepPAP System. This specimen has been analyzed by the ThinPrep Imaging System ([employer]), an automated imaging and review system which assists  the laboratory in evaluating   cells on ThinPrep PAP tests. Following automated imaging, selected fields from every slide are reviewed by a cytotechnologist and/or pathologist.     Screening was performed at [facility], [facility], [place] [address].      HPV other High Risk types, PCR 08/26/2024 Negative  Negative Final    HPV High Risk type 16, PCR 08/26/2024 Negative  Negative Final    HPV High Risk type 18, PCR 08/26/2024 Negative  Negative Final    HSV PCR Source 08/26/2024 Other, Vulvar lesion   Corrected    HSV1, PCR 08/26/2024 Negative  Negative Final    HSV2, PCR 08/26/2024 Negative  Negative Final       Past Medical History:   Diagnosis Date    Acne     Class 2 obesity in adult     Hepatic steatosis 04/19/2022    Menorrhagia     Therapy      Past Surgical History:   Procedure Laterality Date    WISDOM TOOTH EXTRACTION  07/2015     Social History     Tobacco Use    Smoking status: Never    Smokeless tobacco: Never   Substance Use Topics    Alcohol use: Yes     Alcohol/week: 1.0 standard drink of alcohol     Types: 1 Glasses of wine per week     Comment: 3-4 servings per month, no daily or heavy alcohol use    Drug use: Never     Family History   Problem Relation Name Age of Onset    Cholelithiasis Mother Lulu     Diabetes Mother Lulu     Anxiety disorder Father Sony Tastet     ADD / ADHD Father Sony Tastet     Diabetes Father Sony Tastet     Esophageal cancer Father Sony Tastet     Liver disease Father Sony Tastet         fatty liver     Stroke Father Sony Tastet         intraoperative    Cancer Father Sony Tastet     Cancer Paternal Uncle Emanuel Tastet         blood    Heart attack Maternal Grandfather      Liver disease Maternal Grandmother Myrna Mack         fatty liver     Breast cancer Maternal Grandmother Myrna Smooth     Cancer Paternal Grandfather Johnnystephane Larsontst         mouth    Breast cancer Paternal Grandmother      Celiac  "disease Neg Hx      Colon cancer Neg Hx      Colon polyps Neg Hx      Crohn's disease Neg Hx      Cystic fibrosis Neg Hx      Hemochromatosis Neg Hx      Inflammatory bowel disease Neg Hx      Irritable bowel syndrome Neg Hx      Liver cancer Neg Hx      Rectal cancer Neg Hx      Stomach cancer Neg Hx      Ulcerative colitis Neg Hx      Neville's disease Neg Hx      Ovarian cancer Neg Hx       OB History    Para Term  AB Living   0 0 0 0 0 0   SAB IAB Ectopic Multiple Live Births   0 0 0 0     Obstetric Comments   Menarche 10       Current Outpatient Medications:     drospirenone-ethinyl estradioL (KHADIJAH) 3-0.02 mg per tablet, Take 1 tablet by mouth once daily., Disp: 84 tablet, Rfl: 3    lisdexamfetamine (VYVANSE) 20 MG capsule, Take 1 capsule (20 mg total) by mouth every morning., Disp: 30 capsule, Rfl: 0    omeprazole (PRILOSEC) 40 MG capsule, Take 1 capsule (40 mg total) by mouth once daily., Disp: 30 capsule, Rfl: 1    semaglutide, weight loss, (WEGOVY) 0.25 mg/0.5 mL PnIj, Inject 0.25 mg into the skin every 7 days., Disp: 2 mL, Rfl: 3    spironolactone (ALDACTONE) 100 MG tablet, Take 100 mg by mouth every evening., Disp: , Rfl:     Review of Systems:  General: No fever, chills, or weight loss.  Chest: No chest pain, shortness of breath, or palpitations.  Breast: No pain, masses, or nipple discharge.  Vulva: No pain, lesions, or itching.  Vagina: No relaxation, itching, discharge, or lesions.  Abdomen: No pain, nausea, vomiting, diarrhea, or constipation.  Urinary: No incontinence, nocturia, frequency, or dysuria.  Extremities:  No leg cramps, edema, or calf pain.  Neurologic: No headaches, dizziness, or visual changes.    Objective:     Vitals:    10/22/24 0836   BP: 114/76   Pulse: 73   Weight: 95.9 kg (211 lb 6.4 oz)   Height: 5' 2" (1.575 m)   PainSc: 0-No pain     Body mass index is 38.67 kg/m².    PHYSICAL EXAM:  APPEARANCE: Well nourished, well developed, in no acute distress.  AFFECT: WNL, " alert and oriented x 3  SKIN: No acne or hirsutism  CHEST: Good respiratory effect    Assessment:    PCOS (polycystic ovarian syndrome)    Class 2 severe obesity due to excess calories with serious comorbidity and body mass index (BMI) of 38.0 to 38.9 in adult  -     semaglutide, weight loss, (WEGOVY) 0.25 mg/0.5 mL PnIj; Inject 0.25 mg into the skin every 7 days.  Dispense: 2 mL; Refill: 3    Fatty liver        BMR calculated at 1625 calories per day.  Plan:   Developed meal plan with handout of preferred foods.  Encouraged lean protein with every meal.  Goal of  105-110g of protein per day  Wide variety of fruits and vegetables.   Log in stephanie with goal of 1200 calories a day  Start Wegovy 0.25mg SC qweekly. Counseled on use.  Reviewed side effects and risks of medications. No family or personal history of thyroid cancer or pancreatitis. Denies underlying gallbladder issues.  Discussed that these are long term options for weight loss and risk of gaining weight back if discontinued   Discussed that GLP-1 can reduce the efficacy of oral contraception pills. She should use back up contraception, like condoms, phexxi, etc, or consider other contraception options.  If not covered by insurance, we did discuss the option of compounded medications.  She would like to try compounded tirzepatide if not covered.  Continue stay as active as she can. Reviewed benefits of strength work outs.  Reviewed the differences between Mounjaro/Zepbound and compounded tirzepatide-methylcobalamin and that these medications are not FDA regulated.  Follow up in 8 weeks.    Instructed patient to call if she experiences any side effects or has any questions.    I spent a total of 35 minutes on the day of the visit.This includes face to face time and non-face to face time preparing to see the patient (eg, review of tests), obtaining and/or reviewing separately obtained history, documenting clinical information in the electronic or other health  record, independently interpreting results and communicating results to the patient/family/caregiver, or care coordinator.

## 2024-10-23 ENCOUNTER — PATIENT MESSAGE (OUTPATIENT)
Dept: OBSTETRICS AND GYNECOLOGY | Facility: CLINIC | Age: 29
End: 2024-10-23
Payer: COMMERCIAL

## 2024-10-23 DIAGNOSIS — E66.01 CLASS 2 SEVERE OBESITY DUE TO EXCESS CALORIES WITH SERIOUS COMORBIDITY AND BODY MASS INDEX (BMI) OF 38.0 TO 38.9 IN ADULT: ICD-10-CM

## 2024-10-23 DIAGNOSIS — E66.812 CLASS 2 SEVERE OBESITY DUE TO EXCESS CALORIES WITH SERIOUS COMORBIDITY AND BODY MASS INDEX (BMI) OF 38.0 TO 38.9 IN ADULT: ICD-10-CM

## 2024-10-23 RX ORDER — SEMAGLUTIDE 0.25 MG/.5ML
0.25 INJECTION, SOLUTION SUBCUTANEOUS
Qty: 2 ML | Refills: 3 | Status: SHIPPED | OUTPATIENT
Start: 2024-10-23

## 2024-10-28 ENCOUNTER — PATIENT MESSAGE (OUTPATIENT)
Dept: PSYCHIATRY | Facility: CLINIC | Age: 29
End: 2024-10-28
Payer: COMMERCIAL

## 2024-10-29 DIAGNOSIS — F90.2 ADHD (ATTENTION DEFICIT HYPERACTIVITY DISORDER), COMBINED TYPE: ICD-10-CM

## 2024-10-29 RX ORDER — LISDEXAMFETAMINE DIMESYLATE 20 MG/1
20 CAPSULE ORAL EVERY MORNING
Qty: 30 CAPSULE | Refills: 0 | Status: SHIPPED | OUTPATIENT
Start: 2024-10-29

## 2024-11-21 ENCOUNTER — E-VISIT (OUTPATIENT)
Dept: PSYCHIATRY | Facility: CLINIC | Age: 29
End: 2024-11-21
Payer: COMMERCIAL

## 2024-11-21 ENCOUNTER — PATIENT MESSAGE (OUTPATIENT)
Dept: PSYCHIATRY | Facility: CLINIC | Age: 29
End: 2024-11-21
Payer: COMMERCIAL

## 2024-11-21 DIAGNOSIS — F90.2 ADHD (ATTENTION DEFICIT HYPERACTIVITY DISORDER), COMBINED TYPE: Primary | ICD-10-CM

## 2024-11-26 RX ORDER — LISDEXAMFETAMINE DIMESYLATE 30 MG/1
30 CAPSULE ORAL EVERY MORNING
Qty: 30 CAPSULE | Refills: 0 | Status: SHIPPED | OUTPATIENT
Start: 2024-11-26

## 2024-11-26 NOTE — PROGRESS NOTES
Patient ID: Natividad Zaldivar is a 29 y.o. female.    Chief Complaint: ADD (Entered automatically based on patient selection in [employer].) and ADHD    The patient initiated a request through [employer] on 11/21/2024 for evaluation and management with a chief complaint of ADD (Entered automatically based on patient selection in [employer].) and ADHD     I evaluated the questionnaire submission on 11/26/2024.    Ohs Peq Evisit Adhd    11/26/2024 11:54 AM CST - Filed by Patient   Do you agree to participate in an E-Visit? Yes   If you have any of the following symptoms, please present to your local emergency room or call 911: I acknowledge   Choose the state of your primary residence [place]   Select all that apply: None of the above   The following vitals are required in order to proceed    Systolic Blood Pressure: 114   Diastolic Blood Pressure: 76   Weight: 198   Height: 62   Pulse: 67   Reason for E-Visit Medication change   What is the name of the medication(s)?  Vyvanse   How often are you supposed to take your medication? 1 time daily   What is your current dose? The dose is usually listed in milligrams or milliliters on the label. 20mg   Are you taking it as prescribed? Yes   Do you have any of the following side effects? None   How often do you take your medication as prescribed? Everyday   Have any of the following kept you from taking your medications as prescribed? (Select all that apply) Other   What is the reason? I havent not taken it   Would you like to change or continue your medication? Change medication   Why do you need the medication changed?  Medication does not work   What symptoms do you have? Inattention;  Hyperactivity;  Impulsivity;  Trouble staying organized;  Forgetfulness;  Restlessness;  Difficulty following instructions;  Poor time management;  Lack of focus;  Trouble with multitasking   How would you describe your symptoms of Attention Deficit Disorder (ADD)? Varies   Have you been diagnosed with  any new heart condition? No   Does anyone in your immediate family have a history of sudden cardiac arrest at a young age (younger than 50)? No   Does anyone in your immediate family have any of the following heart conditions: Hypertrophic Cardiomyopathy, Prolonged QT Syndrome, Brugada Syndrome or Rios Parkinson White Syndrome? No   Provide any additional information you feel is important. I included a statement below on my symptoms. I do not want to switch from Vyvanse, only considering whether I need to change my dose   Please attach any relevant images or files File not available.   What problem was the medication supposed to address? ADHD/focus/executive function   What effect has your medication had on your problem? Less than expected   What effect has your medication had on your problem? I am hesitant to say I want to change thr medication or the dose, but in the bernard three weeks of taking the 20mg dose it was great and i could feel it working. Since the middle of October, which would be the 4th week and forward I havent really felt it.         Encounter Diagnosis   Name Primary?    ADHD (attention deficit hyperactivity disorder), combined type Yes        No orders of the defined types were placed in this encounter.     Medications Ordered This Encounter   Medications    lisdexamfetamine (VYVANSE) 30 MG capsule     Sig: Take 1 capsule (30 mg total) by mouth every morning.     Dispense:  30 capsule     Refill:  0        No follow-ups on file.      E-Visit Time Tracking:    Day 1 Time (in minutes): 9    Total Time (in minutes): 9

## 2024-12-17 ENCOUNTER — TELEPHONE (OUTPATIENT)
Dept: OBSTETRICS AND GYNECOLOGY | Facility: CLINIC | Age: 29
End: 2024-12-17
Payer: COMMERCIAL

## 2024-12-17 ENCOUNTER — PATIENT MESSAGE (OUTPATIENT)
Dept: OBSTETRICS AND GYNECOLOGY | Facility: CLINIC | Age: 29
End: 2024-12-17
Payer: COMMERCIAL

## 2024-12-17 DIAGNOSIS — E66.812 CLASS 2 SEVERE OBESITY DUE TO EXCESS CALORIES WITH SERIOUS COMORBIDITY AND BODY MASS INDEX (BMI) OF 38.0 TO 38.9 IN ADULT: Primary | ICD-10-CM

## 2024-12-17 DIAGNOSIS — E66.01 CLASS 2 SEVERE OBESITY DUE TO EXCESS CALORIES WITH SERIOUS COMORBIDITY AND BODY MASS INDEX (BMI) OF 38.0 TO 38.9 IN ADULT: Primary | ICD-10-CM

## 2024-12-17 RX ORDER — SEMAGLUTIDE 0.5 MG/.5ML
0.5 INJECTION, SOLUTION SUBCUTANEOUS
Qty: 2 ML | Refills: 3 | Status: SHIPPED | OUTPATIENT
Start: 2024-12-17

## 2024-12-17 NOTE — TELEPHONE ENCOUNTER
Scheduled for virtual follow up. Unable to log in for appt. Will increase Wegovy to 0.5mg and reschedule follow up.

## 2024-12-30 ENCOUNTER — OFFICE VISIT (OUTPATIENT)
Dept: PSYCHIATRY | Facility: CLINIC | Age: 29
End: 2024-12-30
Payer: COMMERCIAL

## 2024-12-30 VITALS
DIASTOLIC BLOOD PRESSURE: 74 MMHG | HEART RATE: 75 BPM | SYSTOLIC BLOOD PRESSURE: 122 MMHG | BODY MASS INDEX: 35.58 KG/M2 | WEIGHT: 194.56 LBS

## 2024-12-30 DIAGNOSIS — F90.2 ADHD (ATTENTION DEFICIT HYPERACTIVITY DISORDER), COMBINED TYPE: ICD-10-CM

## 2024-12-30 PROCEDURE — 1160F RVW MEDS BY RX/DR IN RCRD: CPT | Mod: CPTII,S$GLB,, | Performed by: NURSE PRACTITIONER

## 2024-12-30 PROCEDURE — 3074F SYST BP LT 130 MM HG: CPT | Mod: CPTII,S$GLB,, | Performed by: NURSE PRACTITIONER

## 2024-12-30 PROCEDURE — 99214 OFFICE O/P EST MOD 30 MIN: CPT | Mod: S$GLB,,, | Performed by: NURSE PRACTITIONER

## 2024-12-30 PROCEDURE — 3044F HG A1C LEVEL LT 7.0%: CPT | Mod: CPTII,S$GLB,, | Performed by: NURSE PRACTITIONER

## 2024-12-30 PROCEDURE — 1159F MED LIST DOCD IN RCRD: CPT | Mod: CPTII,S$GLB,, | Performed by: NURSE PRACTITIONER

## 2024-12-30 PROCEDURE — 99999 PR PBB SHADOW E&M-EST. PATIENT-LVL III: CPT | Mod: PBBFAC,,, | Performed by: NURSE PRACTITIONER

## 2024-12-30 PROCEDURE — 3008F BODY MASS INDEX DOCD: CPT | Mod: CPTII,S$GLB,, | Performed by: NURSE PRACTITIONER

## 2024-12-30 PROCEDURE — 3078F DIAST BP <80 MM HG: CPT | Mod: CPTII,S$GLB,, | Performed by: NURSE PRACTITIONER

## 2024-12-30 RX ORDER — LISDEXAMFETAMINE DIMESYLATE 30 MG/1
30 CAPSULE ORAL EVERY MORNING
Qty: 30 CAPSULE | Refills: 0 | Status: SHIPPED | OUTPATIENT
Start: 2025-02-28

## 2024-12-30 RX ORDER — LISDEXAMFETAMINE DIMESYLATE 30 MG/1
30 CAPSULE ORAL EVERY MORNING
Qty: 30 CAPSULE | Refills: 0 | Status: SHIPPED | OUTPATIENT
Start: 2024-12-30

## 2024-12-30 RX ORDER — LISDEXAMFETAMINE DIMESYLATE 30 MG/1
30 CAPSULE ORAL EVERY MORNING
Qty: 30 CAPSULE | Refills: 0 | Status: SHIPPED | OUTPATIENT
Start: 2025-01-29

## 2024-12-30 NOTE — PROGRESS NOTES
"Outpatient Psychiatry Follow-Up Visit (MD/NP)    12/30/2024    Clinical Status of Patient:  Outpatient (Ambulatory)    Chief Complaint:  Natividad Zaldivar is a 29 y.o. female who presents today for follow-up of anxiety and attention problems.  Met with patient.      Interval History and Content of Current Session:  Interim Events/Subjective Report/Content of Current Session:     Last seen for initial evaluatoin 9/30/2024.     Patient reports a history of anxiety. No history of requiring additional medication management support for anxiety.      Has been seeing Milka Hua LPC for over a year for psychotherapy. Initially sought care for assistance with grief as father was passing from esophageal cancer.      Found therapy to be incredibly helpful, and continued to see Milka once grief symptoms resolved.     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      Has been discussing with Milka over time traits of concern for ADHD.      Remembers in elementary school tested for concerns with "working memory", attention, focus, and memory. Received accommodations for extended time with testing until Jorge year of High School.     Admits senior year did not renew accommodations due to feeling other than.     When entering law school, noticed she significantly struggled with getting through courses. If classes were longer than an hour, would feel restless, needing to leave classroom.      Considered getting tested again at that time, but was concerned the extra time in school would make it more difficult for her to pass the Bar exam.      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 Describes this transition as difficult as she is struggling more with executive dysfunction due to tasks associated with this position.      Executive dysfunction struggles carry over into personal life. Moved in May, and is still working on unpacking. " "          Started vyvanse 20mg.     End of November did an e-visit and increased Vyvanse to 30 mg.       Will be switching insurances soon.           Presents today reporting she tolerated Vyvanse well with more time.     Initially experienced nausea, but has since resolved. Notes dry mouth and increased thirst initially, but has improved with more time.     Admits she often runs dehydrated so has found this to be helpful focusing on adequate hydration.     Notes improvements in attention and focus. Has found the 30 mg dose to be more effective and longer lasting. Decreased impulsivity.     "I didn't realize how hyperactive I was until I wasn't"    Noticeable difference by peers and family.     Has noticed additional benefits in binge eating "quieting the food noise."     Taking around 7:30 - 8 AM and lasting until around 4:30 in afternoon.     Admits schedule this month and responsibilities have been different due to several trainings and holidays.     Has been able to miss caffeine several days, which she reports "is unheard of" for her.     Describes feeling more calm.     "I am a much better  on it."     Decreased impulse shopping.     Sleep has been more regulated with more time on medications.     Started Wegovy in November. Tolerating well.     Has continued to meet with Milka for psychotherapy.     Discussed dietary factors that could improve additional attention and focus. Recommend 30 grams or more of protein in AM for mood stabilization, attention and focus, and satiety.     Denies SI/HI/AVH.            Psychotherapy:  Target symptoms: distractability, lack of focus  Why chosen therapy is appropriate versus another modality: relevant to diagnosis  Outcome monitoring methods: self-report, observation  Therapeutic intervention type: insight oriented psychotherapy, supportive psychotherapy  Topics discussed/themes: building skills sets for symptom management, symptom recognition, life stage " transitional issues  The patient's response to the intervention is accepting. The patient's progress toward treatment goals is excellent.   Duration of intervention: 15 minutes.    Review of Systems   PSYCHIATRIC: Pertinant items are noted in the narrative.  CONSTITUTIONAL: No weight gain or loss.   MUSCULOSKELETAL: No pain or stiffness of the joints.  NEUROLOGIC: No weakness, sensory changes, seizures, confusion, memory loss, tremor or other abnormal movements.  ENDOCRINE: No polydipsia or polyuria.  INTEGUMENTARY: No rashes or lacerations.  EYES: No exophthalmos, jaundice or blindness.  ENT: No dizziness, tinnitus or hearing loss.  RESPIRATORY: No shortness of breath.  CARDIOVASCULAR: No tachycardia or chest pain.  GASTROINTESTINAL: No nausea, vomiting, pain, constipation or diarrhea.  GENITOURINARY: No frequency, dysuria or sexual dysfunction.  HEMATOLOGIC/LYMPHATIC: No excessive bleeding, prolonged or excessive bleeding after dental extraction/injury.  ALLERGIC/IMMUNOLOGIC: No allergic response to materials, foods or animals at this time.    Past Medical, Family and Social History: The patient's past medical, family and social history have been reviewed and updated as appropriate within the electronic medical record - see encounter notes.    Compliance: yes    Side effects: None    Risk Parameters:  Patient reports no suicidal ideation  Patient reports no homicidal ideation  Patient reports no self-injurious behavior  Patient reports no violent behavior    Exam (detailed: at least 9 elements; comprehensive: all 15 elements)   Constitutional  Vitals:  Most recent vital signs, dated less than 90 days prior to this appointment, were reviewed.   Vitals:    12/30/24 0904   BP: 122/74   Pulse: 75   Weight: 88.3 kg (194 lb 8.9 oz)        General:  unremarkable, age appropriate     Musculoskeletal  Muscle Strength/Tone:  not examined   Gait & Station:  non-ataxic     Psychiatric  Speech:  no latency; no press   Mood &  Affect:  steady  congruent and appropriate   Thought Process:  normal and logical   Associations:  intact   Thought Content:  normal, no suicidality, no homicidality, delusions, or paranoia   Insight:  intact, has awareness of illness   Judgement: behavior is adequate to circumstances   Orientation:  grossly intact   Memory: intact for content of interview   Language: grossly intact   Attention Span & Concentration:  able to focus   Fund of Knowledge:  intact and appropriate to age and level of education     Assessment and Diagnosis   Status/Progress: Based on the examination today, the patient's problem(s) is/are improved and well controlled.  New problems have not been presented today.   Co-morbidities, Diagnostic uncertainty, and Lack of compliance are not complicating management of the primary condition.  There are no active rule-out diagnoses for this patient at this time.     General Impression: Natividad Zaldivar is a 29 year old female presenting to follow up after establishing care for evaluation and management of executive dysfunction and adjustment anxiety.      Meets criteria for ADHD as suspected by referring therapist.      Low risk to start trial of stimulant medication, tolerating well.      Discussed in length risks and benefits in addition to follow up requirements required for continued controlled substance prescribing.          ICD-10-CM ICD-9-CM   1. ADHD (attention deficit hyperactivity disorder), combined type  F90.2 314.01       Intervention/Counseling/Treatment Plan   Medication Management: Continue Vyvanse 30 mg for ADHD symptoms. Two additional scripts post dated.    reviewed no flagged dispensing.   Discussed with patient informed consent, risks vs. benefits, alternative treatments, side effect profile and the inherent unpredictability of individual responses to these treatments. The patient expresses understanding of the above and displays the capacity to agree with this current plan and  had no other questions.  Encouraged Patient to keep future appointments.   Take medications as prescribed and abstain from substance abuse.   Safety plan reviewed with patient for worsening condition or suicidal ideations. In the event of an emergency patient was advised to go to the emergency room.      Return to Clinic: 3 months

## 2024-12-31 ENCOUNTER — OFFICE VISIT (OUTPATIENT)
Dept: OBSTETRICS AND GYNECOLOGY | Facility: CLINIC | Age: 29
End: 2024-12-31
Payer: COMMERCIAL

## 2024-12-31 VITALS — BODY MASS INDEX: 34.78 KG/M2 | HEIGHT: 62 IN | WEIGHT: 189 LBS

## 2024-12-31 DIAGNOSIS — E28.2 PCOS (POLYCYSTIC OVARIAN SYNDROME): Primary | ICD-10-CM

## 2024-12-31 DIAGNOSIS — E66.01 CLASS 2 SEVERE OBESITY DUE TO EXCESS CALORIES WITH SERIOUS COMORBIDITY AND BODY MASS INDEX (BMI) OF 38.0 TO 38.9 IN ADULT: ICD-10-CM

## 2024-12-31 DIAGNOSIS — K76.0 FATTY LIVER: ICD-10-CM

## 2024-12-31 DIAGNOSIS — E66.812 CLASS 2 SEVERE OBESITY DUE TO EXCESS CALORIES WITH SERIOUS COMORBIDITY AND BODY MASS INDEX (BMI) OF 38.0 TO 38.9 IN ADULT: ICD-10-CM

## 2024-12-31 NOTE — PROGRESS NOTES
The patient location is: home  The chief complaint leading to consultation is: follow up weight loss    Visit type: audiovisual    Face to Face time with patient: 10 minutes  15 minutes of total time spent on the encounter, which includes face to face time and non-face to face time preparing to see the patient (eg, review of tests), Obtaining and/or reviewing separately obtained history, Documenting clinical information in the electronic or other health record, Independently interpreting results (not separately reported) and communicating results to the patient/family/caregiver, or Care coordination (not separately reported).         Each patient to whom he or she provides medical services by telemedicine is:  (1) informed of the relationship between the physician and patient and the respective role of any other health care provider with respect to management of the patient; and (2) notified that he or she may decline to receive medical services by telemedicine and may withdraw from such care at any time.    Notes:    Subjective:      Natividad Zaldivar is a 29 y.o. female with history of PCOS and fatty liver who presents for weight loss follow up. She is taking Wegovy 0.25mg SC weekly. Tolerating well without side effects. Have increased to 0.5mg but start this weekend. Reduced appetite with this and vyvanse. However, she denies skipping meals. Working to increase her protein at each meal. She is staying hydrated. She is walking 3-5 miles daily. She will have new insurance in 1/2025. Checked and Wegovy is on the forumlary. Will try to submit to [facility] in the new year.    Routine Screening Labs: 3/4/2024     No visits with results within 3 Month(s) from this visit.   Latest known visit with results is:   Office Visit on 08/26/2024   Component Date Value Ref Range Status    Final Pathologic Diagnosis 08/26/2024    Final                    Value:Specimen Adequacy  Satisfactory for interpretation. Endocervical  component is absent.    [place] Category  Negative for intraepithelial lesion or malignancy.      Disclaimer 08/26/2024    Final                    Value:The Pap smear is a screening test that aids in the detection of cervical cancer and cancer precursors. Both false positive and false negative results can occur. The test should be used at regular intervals, and positive results should be confirmed before   definitive therapy.  This liquid based specimen is processed using the  or  Thin PrepPAP System. This specimen has been analyzed by the ThinPrep Imaging System ([place]), an automated imaging and review system which assists the laboratory in evaluating   cells on ThinPrep PAP tests. Following automated imaging, selected fields from every slide are reviewed by a cytotechnologist and/or pathologist.     Screening was performed at [facility], [facility] [place] [address].      HPV other High Risk types, PCR 08/26/2024 Negative  Negative Final    HPV High Risk type 16, PCR 08/26/2024 Negative  Negative Final    HPV High Risk type 18, PCR 08/26/2024 Negative  Negative Final    HSV PCR Source 08/26/2024 Other, Vulvar lesion   Corrected    HSV1, PCR 08/26/2024 Negative  Negative Final    HSV2, PCR 08/26/2024 Negative  Negative Final       Past Medical History:   Diagnosis Date    Acne     Class 2 obesity in adult     Hepatic steatosis 04/19/2022    Menorrhagia     Therapy      Past Surgical History:   Procedure Laterality Date    WISDOM TOOTH EXTRACTION  07/2015     Social History     Tobacco Use    Smoking status: Never    Smokeless tobacco: Never   Substance Use Topics    Alcohol use: Yes     Alcohol/week: 1.0 standard drink of alcohol     Types: 1 Glasses of wine per week     Comment: 3-4 servings per month, no daily or heavy alcohol use    Drug use: Never     Family History   Problem Relation Name Age of Onset    Cholelithiasis Mother  Lulu     Diabetes Mother Lulu     Anxiety disorder Father Sony Castrejont     ADD / ADHD Father Sony Tastet     Diabetes Father Sony Zaldivar     Esophageal cancer Father Sony Tastet     Liver disease Father Sony Tasteraven         fatty liver     Stroke Father Sony Tastet         intraoperative    Cancer Father Sony Tastet     Cancer Paternal Uncle Emanuel Tastet         blood    Heart attack Maternal Grandfather      Liver disease Maternal Grandmother Myrna Rebollar         fatty liver     Breast cancer Maternal Grandmother Myrna Rebollar     Cancer Paternal Grandfather Johnny Tastst         mouth    Breast cancer Paternal Grandmother      Celiac disease Neg Hx      Colon cancer Neg Hx      Colon polyps Neg Hx      Crohn's disease Neg Hx      Cystic fibrosis Neg Hx      Hemochromatosis Neg Hx      Inflammatory bowel disease Neg Hx      Irritable bowel syndrome Neg Hx      Liver cancer Neg Hx      Rectal cancer Neg Hx      Stomach cancer Neg Hx      Ulcerative colitis Neg Hx      Neville's disease Neg Hx      Ovarian cancer Neg Hx       OB History    Para Term  AB Living   0 0 0 0 0 0   SAB IAB Ectopic Multiple Live Births   0 0 0 0     Obstetric Comments   Menarche 10       Current Outpatient Medications:     drospirenone-ethinyl estradioL (KHADIJAH) 3-0.02 mg per tablet, Take 1 tablet by mouth once daily., Disp: 84 tablet, Rfl: 3    lisdexamfetamine (VYVANSE) 30 MG capsule, Take 1 capsule (30 mg total) by mouth every morning., Disp: 30 capsule, Rfl: 0    [START ON 2025] lisdexamfetamine (VYVANSE) 30 MG capsule, Take 1 capsule (30 mg total) by mouth every morning., Disp: 30 capsule, Rfl: 0    [START ON 2025] lisdexamfetamine (VYVANSE) 30 MG capsule, Take 1 capsule (30 mg total) by mouth every morning., Disp: 30 capsule, Rfl: 0    omeprazole (PRILOSEC) 40 MG capsule, Take 1 capsule (40 mg total) by mouth once daily., Disp: 30 capsule, Rfl: 1    semaglutide, weight loss, (WEGOVY) 0.5 mg/0.5 mL PnIj,  "Inject 0.5 mg into the skin every 7 days., Disp: 2 mL, Rfl: 3    spironolactone (ALDACTONE) 100 MG tablet, Take 100 mg by mouth every evening., Disp: , Rfl:     Review of Systems:  General: No fever, chills.  Chest: No chest pain, shortness of breath, or palpitations.  Breast: No pain, masses, or nipple discharge.  Vulva: No pain, lesions, or itching.  Vagina: No relaxation, itching, discharge, or lesions.  Abdomen: No pain, nausea, vomiting, diarrhea, or constipation.  Urinary: No incontinence, nocturia, frequency, or dysuria.  Extremities:  No leg cramps, edema, or calf pain.  Neurologic: No headaches, dizziness, or visual changes.    Objective:     Vitals:    12/31/24 0803   Weight: 85.7 kg (189 lb)   Height: 5' 2" (1.575 m)     Body mass index is 34.57 kg/m².    PHYSICAL EXAM:  APPEARANCE: Well nourished, well developed, in no acute distress.  AFFECT: WNL, alert and oriented x 3      Assessment:      PCOS (polycystic ovarian syndrome)    Class 2 severe obesity due to excess calories with serious comorbidity and body mass index (BMI) of 38.0 to 38.9 in adult    Fatty liver        Plan:   Continue low glycemic diet with lean protein at each meal.  Maintain hydration.  Continue walking 3-5 miles daily  Planning to add strength workouts as well  Increasing Wegovy to 0.5mg this weekend  She will contact me when needs her next refill and will send to [facility]  Follow up in 3 months    Instructed patient to call if she experiences any side effects or has any questions.    I spent a total of 15 minutes on the day of the visit.This includes face to face time and non-face to face time preparing to see the patient (eg, review of tests), obtaining and/or reviewing separately obtained history, documenting clinical information in the electronic or other health record, independently interpreting results and communicating results to the patient/family/caregiver, or care coordinator.       "
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
## 2025-01-14 ENCOUNTER — PATIENT MESSAGE (OUTPATIENT)
Dept: HEPATOLOGY | Facility: CLINIC | Age: 30
End: 2025-01-14
Payer: COMMERCIAL

## 2025-02-18 ENCOUNTER — PATIENT MESSAGE (OUTPATIENT)
Dept: OBSTETRICS AND GYNECOLOGY | Facility: CLINIC | Age: 30
End: 2025-02-18
Payer: COMMERCIAL

## 2025-02-18 RX ORDER — SEMAGLUTIDE 1 MG/.5ML
1 INJECTION, SOLUTION SUBCUTANEOUS
Qty: 2 ML | Refills: 3 | Status: SHIPPED | OUTPATIENT
Start: 2025-02-18

## 2025-03-01 ENCOUNTER — OFFICE VISIT (OUTPATIENT)
Dept: URGENT CARE | Facility: CLINIC | Age: 30
End: 2025-03-01
Payer: COMMERCIAL

## 2025-03-01 VITALS
RESPIRATION RATE: 16 BRPM | OXYGEN SATURATION: 98 % | SYSTOLIC BLOOD PRESSURE: 131 MMHG | DIASTOLIC BLOOD PRESSURE: 73 MMHG | WEIGHT: 189 LBS | TEMPERATURE: 98 F | HEART RATE: 79 BPM | BODY MASS INDEX: 34.57 KG/M2

## 2025-03-01 DIAGNOSIS — J06.9 VIRAL URI: Primary | ICD-10-CM

## 2025-03-01 DIAGNOSIS — J34.9 SINUS PROBLEM: ICD-10-CM

## 2025-03-01 LAB
CTP QC/QA: YES
CTP QC/QA: YES
POC MOLECULAR INFLUENZA A AGN: NEGATIVE
POC MOLECULAR INFLUENZA B AGN: NEGATIVE
SARS CORONAVIRUS 2 ANTIGEN: NEGATIVE

## 2025-03-01 PROCEDURE — 99203 OFFICE O/P NEW LOW 30 MIN: CPT | Mod: S$GLB,,,

## 2025-03-01 PROCEDURE — 87811 SARS-COV-2 COVID19 W/OPTIC: CPT | Mod: QW,S$GLB,,

## 2025-03-01 PROCEDURE — 87502 INFLUENZA DNA AMP PROBE: CPT | Mod: QW,S$GLB,,

## 2025-03-01 NOTE — PATIENT INSTRUCTIONS
"                                                         URI   If your condition worsens or fails to improve we recommend that you receive another evaluation at the urgent care/ER immediately or contact your PCP to discuss your concerns. You must understand that you've received an urgent care treatment only and that you may be released before all your medical problems are known or treated. You the patient will arrange for follouwp care as instructed.     we discussed that I think your illness is viral, it will not respond to antibiotics and can last 10-14 days.     Retest for covid at home in 48 hours  Zyrtec D, Claritin D or Allegra D can also help with symptoms of congestion and drainage.   If you have hypertension avoid using the "D" which is the decongestant   If you just have drainage you can take plain zyrtec, claritin or allegra   Use Flonase for postnasal drip and nasal congestion  Rest and fluids are also important.     Salt water gargles, warm tea with honey and chloraseptic spray as needed for sore throat.   Tylenol or ibuprofen can also be used as directed for pain unless you have an allergy to them or medical condition such as stomach ulcers, kidney or liver disease or blood thinners etc for which you should not be taking these type of medications.       "

## 2025-03-01 NOTE — PROGRESS NOTES
Subjective:      Patient ID: Natividad Zaldivar is a 29 y.o. female.    Vitals:  weight is 85.7 kg (189 lb). Her oral temperature is 98.4 °F (36.9 °C). Her blood pressure is 131/73 and her pulse is 79. Her respiration is 16 and oxygen saturation is 98%.     Chief Complaint: Sinus Problem    This is a 29 y.o. female who presents today with a chief complaint of fatigue, runny nose, felt feverish (highest temp = not recorded, flushed face and minor body aches), nasal congestion, post nasal drip, minor sore throat and headache x 2 days. Pt also presents with ear congestion. No nausea, vomiting, diarrhea, abd pain, cough.  Home treatment includes zycam        Sinus Problem  This is a new problem. The current episode started in the past 7 days. The problem is unchanged. There has been no fever. Her pain is at a severity of 3/10. The pain is mild. Associated symptoms include congestion, headaches, sinus pressure, sneezing, a sore throat and swollen glands. Pertinent negatives include no coughing, ear pain or neck pain.       Constitution: Positive for fatigue.   HENT:  Positive for congestion, postnasal drip, sinus pressure and sore throat. Negative for ear pain.    Neck: Negative for neck pain.   Cardiovascular: Negative.    Eyes: Negative.    Respiratory:  Negative for cough.    Gastrointestinal: Negative.    Endocrine: negative.   Musculoskeletal: Negative.    Skin: Negative.    Allergic/Immunologic: Positive for sneezing.   Neurological:  Positive for headaches.   Hematologic/Lymphatic: Negative.    Psychiatric/Behavioral: Negative.        Objective:     Physical Exam   Constitutional: She is oriented to person, place, and time.  Non-toxic appearance. No distress.   HENT:   Head: Normocephalic and atraumatic.   Ears:   Right Ear: Tympanic membrane, external ear and ear canal normal.   Left Ear: Tympanic membrane, external ear and ear canal normal.   Nose: Congestion present.   Mouth/Throat: Posterior oropharyngeal erythema  present. No oropharyngeal exudate, posterior oropharyngeal edema, tonsillar abscesses or cobblestoning.   Eyes: Conjunctivae are normal.   Cardiovascular: Normal rate, regular rhythm and normal heart sounds.   Pulmonary/Chest: Effort normal and breath sounds normal. No stridor. No respiratory distress. She has no wheezes. She has no rhonchi. She has no rales.   Musculoskeletal: Normal range of motion.         General: Normal range of motion.   Neurological: She is alert and oriented to person, place, and time.   Skin: Skin is warm, dry and not diaphoretic.   Psychiatric: Her behavior is normal. Mood, judgment and thought content normal.     Results for orders placed or performed in visit on 03/01/25   SARS Coronavirus 2 Antigen, POCT Manual Read    Collection Time: 03/01/25  5:41 PM   Result Value Ref Range    SARS Coronavirus 2 Antigen Negative Negative, Presumptive Negative     Acceptable Yes    POCT Influenza A/B MOLECULAR    Collection Time: 03/01/25  5:45 PM   Result Value Ref Range    POC Molecular Influenza A Ag Negative Negative    POC Molecular Influenza B Ag Negative Negative     Acceptable Yes        Assessment:     1. Viral URI    2. Sinus problem        Plan:       Viral URI    Sinus problem  -     POCT Influenza A/B MOLECULAR  -     SARS Coronavirus 2 Antigen, POCT Manual Read        Patient Instructions                                                            URI   If your condition worsens or fails to improve we recommend that you receive another evaluation at the urgent care/ER immediately or contact your PCP to discuss your concerns. You must understand that you've received an urgent care treatment only and that you may be released before all your medical problems are known or treated. You the patient will arrange for follouwp care as instructed.     we discussed that I think your illness is viral, it will not respond to antibiotics and can last 10-14 days.  "    Retest for covid at home in 48 hours  Zyrtec D, Claritin D or Allegra D can also help with symptoms of congestion and drainage.   If you have hypertension avoid using the "D" which is the decongestant   If you just have drainage you can take plain zyrtec, claritin or allegra   Use Flonase for postnasal drip and nasal congestion  Rest and fluids are also important.     Salt water gargles, warm tea with honey and chloraseptic spray as needed for sore throat.   Tylenol or ibuprofen can also be used as directed for pain unless you have an allergy to them or medical condition such as stomach ulcers, kidney or liver disease or blood thinners etc for which you should not be taking these type of medications.                     "

## 2025-03-06 ENCOUNTER — PATIENT MESSAGE (OUTPATIENT)
Dept: OBSTETRICS AND GYNECOLOGY | Facility: CLINIC | Age: 30
End: 2025-03-06
Payer: COMMERCIAL

## 2025-03-06 DIAGNOSIS — E66.812 CLASS 2 SEVERE OBESITY DUE TO EXCESS CALORIES WITH SERIOUS COMORBIDITY AND BODY MASS INDEX (BMI) OF 38.0 TO 38.9 IN ADULT: Primary | ICD-10-CM

## 2025-03-06 DIAGNOSIS — E66.01 CLASS 2 SEVERE OBESITY DUE TO EXCESS CALORIES WITH SERIOUS COMORBIDITY AND BODY MASS INDEX (BMI) OF 38.0 TO 38.9 IN ADULT: Primary | ICD-10-CM

## 2025-03-06 DIAGNOSIS — E28.2 PCOS (POLYCYSTIC OVARIAN SYNDROME): ICD-10-CM

## 2025-03-06 RX ORDER — SEMAGLUTIDE 0.5 MG/.5ML
0.5 INJECTION, SOLUTION SUBCUTANEOUS
Qty: 2 ML | Refills: 3 | Status: SHIPPED | OUTPATIENT
Start: 2025-03-06

## 2025-03-11 ENCOUNTER — LAB VISIT (OUTPATIENT)
Dept: LAB | Facility: HOSPITAL | Age: 30
End: 2025-03-11
Payer: COMMERCIAL

## 2025-03-11 ENCOUNTER — OFFICE VISIT (OUTPATIENT)
Dept: INTERNAL MEDICINE | Facility: CLINIC | Age: 30
End: 2025-03-11
Payer: COMMERCIAL

## 2025-03-11 ENCOUNTER — TELEPHONE (OUTPATIENT)
Dept: HEMATOLOGY/ONCOLOGY | Facility: CLINIC | Age: 30
End: 2025-03-11
Payer: COMMERCIAL

## 2025-03-11 VITALS
HEART RATE: 83 BPM | SYSTOLIC BLOOD PRESSURE: 122 MMHG | DIASTOLIC BLOOD PRESSURE: 70 MMHG | BODY MASS INDEX: 32 KG/M2 | RESPIRATION RATE: 18 BRPM | OXYGEN SATURATION: 99 % | WEIGHT: 174.94 LBS

## 2025-03-11 DIAGNOSIS — Z80.9 FAMILY HISTORY OF CANCER: ICD-10-CM

## 2025-03-11 DIAGNOSIS — K76.0 METABOLIC DYSFUNCTION-ASSOCIATED STEATOTIC LIVER DISEASE (MASLD): ICD-10-CM

## 2025-03-11 DIAGNOSIS — Z00.00 ENCOUNTER FOR PREVENTIVE HEALTH EXAMINATION: ICD-10-CM

## 2025-03-11 DIAGNOSIS — Z00.00 ENCOUNTER FOR PREVENTIVE HEALTH EXAMINATION: Primary | ICD-10-CM

## 2025-03-11 DIAGNOSIS — E66.09 CLASS 1 OBESITY DUE TO EXCESS CALORIES WITH SERIOUS COMORBIDITY AND BODY MASS INDEX (BMI) OF 32.0 TO 32.9 IN ADULT: ICD-10-CM

## 2025-03-11 DIAGNOSIS — F90.8 OTHER SPECIFIED ATTENTION DEFICIT HYPERACTIVITY DISORDER (ADHD): ICD-10-CM

## 2025-03-11 DIAGNOSIS — E66.811 CLASS 1 OBESITY DUE TO EXCESS CALORIES WITH SERIOUS COMORBIDITY AND BODY MASS INDEX (BMI) OF 32.0 TO 32.9 IN ADULT: ICD-10-CM

## 2025-03-11 LAB
ALBUMIN SERPL BCP-MCNC: 4 G/DL (ref 3.5–5.2)
ALP SERPL-CCNC: 64 U/L (ref 40–150)
ALT SERPL W/O P-5'-P-CCNC: 89 U/L (ref 10–44)
ANION GAP SERPL CALC-SCNC: 12 MMOL/L (ref 8–16)
AST SERPL-CCNC: 35 U/L (ref 10–40)
BASOPHILS # BLD AUTO: 0.06 K/UL (ref 0–0.2)
BASOPHILS NFR BLD: 0.7 % (ref 0–1.9)
BILIRUB SERPL-MCNC: 0.3 MG/DL (ref 0.1–1)
BUN SERPL-MCNC: 11 MG/DL (ref 6–20)
CALCIUM SERPL-MCNC: 9.9 MG/DL (ref 8.7–10.5)
CHLORIDE SERPL-SCNC: 106 MMOL/L (ref 95–110)
CHOLEST SERPL-MCNC: 225 MG/DL (ref 120–199)
CHOLEST/HDLC SERPL: 4.4 {RATIO} (ref 2–5)
CO2 SERPL-SCNC: 21 MMOL/L (ref 23–29)
CREAT SERPL-MCNC: 0.8 MG/DL (ref 0.5–1.4)
DIFFERENTIAL METHOD BLD: NORMAL
EOSINOPHIL # BLD AUTO: 0.1 K/UL (ref 0–0.5)
EOSINOPHIL NFR BLD: 1.5 % (ref 0–8)
ERYTHROCYTE [DISTWIDTH] IN BLOOD BY AUTOMATED COUNT: 12.8 % (ref 11.5–14.5)
EST. GFR  (NO RACE VARIABLE): >60 ML/MIN/1.73 M^2
ESTIMATED AVG GLUCOSE: 108 MG/DL (ref 68–131)
GLUCOSE SERPL-MCNC: 74 MG/DL (ref 70–110)
HBA1C MFR BLD: 5.4 % (ref 4–5.6)
HCT VFR BLD AUTO: 41.6 % (ref 37–48.5)
HDLC SERPL-MCNC: 51 MG/DL (ref 40–75)
HDLC SERPL: 22.7 % (ref 20–50)
HGB BLD-MCNC: 14 G/DL (ref 12–16)
IMM GRANULOCYTES # BLD AUTO: 0.02 K/UL (ref 0–0.04)
IMM GRANULOCYTES NFR BLD AUTO: 0.2 % (ref 0–0.5)
LDLC SERPL CALC-MCNC: 130.2 MG/DL (ref 63–159)
LYMPHOCYTES # BLD AUTO: 3.5 K/UL (ref 1–4.8)
LYMPHOCYTES NFR BLD: 40.4 % (ref 18–48)
MCH RBC QN AUTO: 31 PG (ref 27–31)
MCHC RBC AUTO-ENTMCNC: 33.7 G/DL (ref 32–36)
MCV RBC AUTO: 92 FL (ref 82–98)
MONOCYTES # BLD AUTO: 0.5 K/UL (ref 0.3–1)
MONOCYTES NFR BLD: 6.3 % (ref 4–15)
NEUTROPHILS # BLD AUTO: 4.4 K/UL (ref 1.8–7.7)
NEUTROPHILS NFR BLD: 50.9 % (ref 38–73)
NONHDLC SERPL-MCNC: 174 MG/DL
NRBC BLD-RTO: 0 /100 WBC
PLATELET # BLD AUTO: 331 K/UL (ref 150–450)
PMV BLD AUTO: 10 FL (ref 9.2–12.9)
POTASSIUM SERPL-SCNC: 3.9 MMOL/L (ref 3.5–5.1)
PROT SERPL-MCNC: 7.4 G/DL (ref 6–8.4)
RBC # BLD AUTO: 4.51 M/UL (ref 4–5.4)
SODIUM SERPL-SCNC: 139 MMOL/L (ref 136–145)
TRIGL SERPL-MCNC: 219 MG/DL (ref 30–150)
TSH SERPL DL<=0.005 MIU/L-ACNC: 2.24 UIU/ML (ref 0.4–4)
WBC # BLD AUTO: 8.55 K/UL (ref 3.9–12.7)

## 2025-03-11 PROCEDURE — 3074F SYST BP LT 130 MM HG: CPT | Mod: CPTII,S$GLB,, | Performed by: PHYSICIAN ASSISTANT

## 2025-03-11 PROCEDURE — 80053 COMPREHEN METABOLIC PANEL: CPT | Performed by: PHYSICIAN ASSISTANT

## 2025-03-11 PROCEDURE — 83036 HEMOGLOBIN GLYCOSYLATED A1C: CPT | Performed by: PHYSICIAN ASSISTANT

## 2025-03-11 PROCEDURE — 3008F BODY MASS INDEX DOCD: CPT | Mod: CPTII,S$GLB,, | Performed by: PHYSICIAN ASSISTANT

## 2025-03-11 PROCEDURE — 99395 PREV VISIT EST AGE 18-39: CPT | Mod: S$GLB,,, | Performed by: PHYSICIAN ASSISTANT

## 2025-03-11 PROCEDURE — 80061 LIPID PANEL: CPT | Performed by: PHYSICIAN ASSISTANT

## 2025-03-11 PROCEDURE — 99999 PR PBB SHADOW E&M-EST. PATIENT-LVL IV: CPT | Mod: PBBFAC,,, | Performed by: PHYSICIAN ASSISTANT

## 2025-03-11 PROCEDURE — 84443 ASSAY THYROID STIM HORMONE: CPT | Performed by: PHYSICIAN ASSISTANT

## 2025-03-11 PROCEDURE — 3078F DIAST BP <80 MM HG: CPT | Mod: CPTII,S$GLB,, | Performed by: PHYSICIAN ASSISTANT

## 2025-03-11 PROCEDURE — 1159F MED LIST DOCD IN RCRD: CPT | Mod: CPTII,S$GLB,, | Performed by: PHYSICIAN ASSISTANT

## 2025-03-11 PROCEDURE — 85025 COMPLETE CBC W/AUTO DIFF WBC: CPT | Performed by: PHYSICIAN ASSISTANT

## 2025-03-11 PROCEDURE — 1160F RVW MEDS BY RX/DR IN RCRD: CPT | Mod: CPTII,S$GLB,, | Performed by: PHYSICIAN ASSISTANT

## 2025-03-11 NOTE — PROGRESS NOTES
Subjective:       Patient ID: Natividad Zaldivar is a 29 y.o. female.        Chief Complaint: Annual Exam    Natividad Zaldivar is an established patient of Angela Quiros MD here today for annual exam.    Obesity, BMI 32 -   Wegovy 0.5 mg/week; 1 mg/week caused terrible nausea and vomiting  Started wegovy 11/2024    Wt Readings from Last 4 Encounters:  03/11/25 : 79.3 kg (174 lb 15 oz)  03/01/25 : 85.7 kg (189 lb)  12/31/24 : 85.7 kg (189 lb)  10/22/24 : 95.9 kg (211 lb 6.4 oz)    36# weight loss so far    PCOS    ADHD - dx 10/2024  Started vyvanse and up to 30 mg daily  Helping a lot  Alisha Mo, NP    No longer having acid reflux and not taking prilosec regularly     Lots of different cancers in family, interested in genetics consult  Father - esophageal and stomach cancer  PGF - throat cancer  PGM - breast cancer  Paternal uncle - leukemia  Mother - none  MGF - none  MGM - breast cancer, pancreatic cancer     Fatty liver, hepatomegaly -   Followed by hepatology  To follow yearly     Acne -  [facility] [address]  Spironolactone 100 mg daily  Dr. Snider     H/o epiploic appendagitis - saw general surgery, tx with ibuprofen and all sx resolved, bowels now moving normally               Review of Systems   Constitutional:  Negative for activity change, chills, diaphoresis, fatigue, fever and unexpected weight change.   HENT:  Negative for congestion, hearing loss, rhinorrhea, sore throat and trouble swallowing.    Eyes:  Negative for discharge and visual disturbance.   Respiratory:  Negative for cough, chest tightness, shortness of breath and wheezing.    Cardiovascular:  Negative for chest pain, palpitations and leg swelling.   Gastrointestinal:  Negative for abdominal pain, blood in stool, constipation, diarrhea, nausea and vomiting.   Endocrine: Negative for polydipsia and polyuria.   Genitourinary:  Negative for difficulty urinating, dysuria, frequency, hematuria, menstrual problem and urgency.    Musculoskeletal:  Negative for arthralgias, back pain, joint swelling and neck pain.   Skin:  Negative for rash.   Neurological:  Negative for dizziness, syncope, weakness and headaches.   Psychiatric/Behavioral:  Negative for confusion, dysphoric mood and sleep disturbance. The patient is not nervous/anxious.        Objective:      Physical Exam  Vitals and nursing note reviewed.   Constitutional:       Appearance: Normal appearance. She is well-developed.   HENT:      Head: Normocephalic.      Right Ear: Tympanic membrane and external ear normal.      Left Ear: Tympanic membrane and external ear normal.      Nose: No mucosal edema or rhinorrhea.      Mouth/Throat:      Pharynx: Oropharynx is clear.   Eyes:      Pupils: Pupils are equal, round, and reactive to light.   Cardiovascular:      Rate and Rhythm: Normal rate and regular rhythm.      Heart sounds: Normal heart sounds. No murmur heard.     No friction rub. No gallop.   Pulmonary:      Effort: Pulmonary effort is normal. No respiratory distress.      Breath sounds: Normal breath sounds.   Abdominal:      Palpations: Abdomen is soft.      Tenderness: There is no abdominal tenderness.   Skin:     General: Skin is warm and dry.   Neurological:      Mental Status: She is alert.         Assessment:       1. Encounter for preventive health examination    2. Class 1 obesity due to excess calories with serious comorbidity and body mass index (BMI) of 32.0 to 32.9 in adult    3. Metabolic dysfunction-associated steatotic liver disease (MASLD)    4. Other specified attention deficit hyperactivity disorder (ADHD) - dx 10/2024    5. Family history of cancer        Plan:       Natividad was seen today for annual exam.    Diagnoses and all orders for this visit:    Encounter for preventive health examination  -     CBC Auto Differential; Future  -     Comprehensive Metabolic Panel; Future  -     Hemoglobin A1C; Future  -     Lipid Panel; Future  -     TSH; Future    Class 1  "obesity due to excess calories with serious comorbidity and body mass index (BMI) of 32.0 to 32.9 in adult - significant weight loss with wegovy!   Wt Readings from Last 4 Encounters:   03/11/25 79.3 kg (174 lb 15 oz)   03/01/25 85.7 kg (189 lb)   12/31/24 85.7 kg (189 lb)   10/22/24 95.9 kg (211 lb 6.4 oz)     Metabolic dysfunction-associated steatotic liver disease (MASLD) - follows with hepatology yearly    Other specified attention deficit hyperactivity disorder (ADHD) - dx 10/2024 - follows with psychiatry     Family history of cancer  -     Ambulatory referral/consult to Genetics; Future    Health and safety issues reviewed    Pt has been given instructions populated from patient instructions database and has verbalized understanding of the after visit summary and information contained wherein.    Follow up with a primary care provider. May go to ER for acute shortness of breath, lightheadedness, fever, or any other emergent complaints or changes in condition.    "This note will be shared with the patient"    Future Appointments   Date Time Provider Department Center   3/27/2025  9:00 AM Joanie Mo NP [facility] PSYCH Marcin Hwsamm   3/28/2025  7:45 AM Natividad Rowland PA-C [facility]   8/7/2025  8:00 AM [facility] OI-US1 MASTER [facility] ULTR [facility]   8/7/2025  8:45 AM [facility] HEPATOLOGY, FIBROSCAN [facility] HEPAT Marcin Laurent   8/26/2025  9:00 AM Ananya Cesar NP [facility] HEPAT Marcin samm                 "

## 2025-03-13 ENCOUNTER — PATIENT MESSAGE (OUTPATIENT)
Dept: HEMATOLOGY/ONCOLOGY | Facility: CLINIC | Age: 30
End: 2025-03-13
Payer: COMMERCIAL

## 2025-03-13 ENCOUNTER — RESULTS FOLLOW-UP (OUTPATIENT)
Dept: INTERNAL MEDICINE | Facility: CLINIC | Age: 30
End: 2025-03-13

## 2025-03-13 ENCOUNTER — TELEPHONE (OUTPATIENT)
Dept: INTERNAL MEDICINE | Facility: CLINIC | Age: 30
End: 2025-03-13
Payer: COMMERCIAL

## 2025-03-13 DIAGNOSIS — R74.8 ELEVATED LIVER ENZYMES: Primary | ICD-10-CM

## 2025-03-13 NOTE — TELEPHONE ENCOUNTER
----- Message from Rosalva Saenz PA-C sent at 3/13/2025  7:32 AM CDT -----  Please call patient to schedule repeat labs in 2-3 weeks - hepatic function panel    Thanks!  ----- Message -----  From: [facility]  Sent: 3/11/2025   5:18 PM CDT  To: Rosalva Saenz PA-C

## 2025-03-20 ENCOUNTER — OFFICE VISIT (OUTPATIENT)
Dept: HEMATOLOGY/ONCOLOGY | Facility: CLINIC | Age: 30
End: 2025-03-20
Payer: COMMERCIAL

## 2025-03-20 ENCOUNTER — PATIENT MESSAGE (OUTPATIENT)
Dept: HEMATOLOGY/ONCOLOGY | Facility: CLINIC | Age: 30
End: 2025-03-20

## 2025-03-20 DIAGNOSIS — Z80.0 FAMILY HISTORY OF PANCREATIC CANCER: ICD-10-CM

## 2025-03-20 DIAGNOSIS — Z71.83 ENCOUNTER FOR NONPROCREATIVE GENETIC COUNSELING: Primary | ICD-10-CM

## 2025-03-20 DIAGNOSIS — Z80.3 FAMILY HISTORY OF BREAST CANCER: ICD-10-CM

## 2025-03-20 DIAGNOSIS — Z91.89 AT RISK FOR BREAST CANCER: ICD-10-CM

## 2025-03-20 NOTE — PROGRESS NOTES
"Cancer Genetics  Department of Hematology and Oncology  The [facility]    Date of Service:  3/20/25  Visit Provider:  Kimo Wiggins DNP  Collaborating Physician:  Leona Mcmullen MD    Patient ID  Name: Natividad Zaldivar    : 1995    MRN: [id number]      Referring Provider  Rosalva Saenz PA-C  [address] [place] [place] [address]    Televisit Information  The patient location is: [place], [place]  The chief complaint leading to consultation is: As below    Visit type: audiovisual    Face to Face time with patient: 40 minutes  50 minutes of total time spent on the encounter, which includes face to face time and non-face to face time preparing to see the patient (eg, review of tests), Obtaining and/or reviewing separately obtained history, Documenting clinical information in the electronic or other health record, Independently interpreting results (not separately reported) and communicating results to the patient/family/caregiver, or Care coordination (not separately reported).         Each patient to whom he or she provides medical services by telemedicine is:  (1) informed of the relationship between the physician and patient and the respective role of any other health care provider with respect to management of the patient; and (2) notified that he or she may decline to receive medical services by telemedicine and may withdraw from such care at any time.    Notes:     SUBJECTIVE      Chief Complaint: Genetic Evaluation    History of Present Illness (HPI):  Natividad Zaldivar ("Natividad"), 29 y.o., assigned female sex at birth, is new to the [facility] Department of H[facility]ology and Oncology and to me.  She was referred by TREVOR Cole, for cancer-genetic risk assessment given her family history of cancer.    Genetic History  Genetic testing:  No    Cancer History  Cancer:  No    Focused Medical History  Colon polyp:  N/A - No history of " colonoscopy  Other tumor or pertinent mass/lesion:  No  Pancreatitis:  No  Blood disorder:  No    Focused Surgical History  Reproductive organs:  Intact  Colonoscopies:  None    Breast Cancer Risk Assessment Questionnaire   Mammographic breast density:  No history of mammogram   Age at menarche:  10  Age at first live childbirth:  Nulligravidous  Menopausal status:  premenopausal; LMP 02/18/2025 (no chance of pregnancy, per patient)  Hormone replacement therapy use history:  never  Breast biopsy history and findings:  never  Thoracic radiation therapy history:  never    Focused Social History  Never smoker    ONCOLOGY PEDIGREE  Ancestry:  Ashkenazi [employer]ism:  No  Consanguinity:  No  Hereditary cancer genetic testing in blood relatives:  No  Other than noted, no known close/distant family history of cancer.  Other than noted, no known family history of pancreatitis, benign tumor/mass/lesion, or colon polyps.  Natividad notes that her father's side of the family is from near [employer] plants.    ** If this pedigree appears small/illegible on your screen, expand this note window horizontally. **      Review of Systems  See HPI.       OBJECTIVE     Patient Active Problem List    Diagnosis Date Noted    Other specified attention deficit hyperactivity disorder (ADHD) - dx 10/2024 03/11/2025    Metabolic dysfunction-associated steatotic liver disease (MASLD) 05/02/2022    Obesity due to excess calories with serious comorbidity 04/20/2022    Epiploic appendagitis - seen on CT 4/2022 04/19/2022     Past Medical History:   Diagnosis Date    Acne     Class 2 obesity in adult     Hepatic steatosis 04/19/2022    Menorrhagia     Therapy      Physical Exam  Significantly limited secondary to the inherent nature of a virtual visit.  Very pleasant patient.  Constitutional      Appearance:  Appears well developed and well nourished. No distress.   Neurological     Mental Status:  Alert and oriented.  Psychiatric         Mood and Affect:   "Normal.     Thought Content:  Normal.     Speech:  Normal.     Behavior:  Normal.     Judgment:  Normal.    ASSESSMENT / PLAN      Natividad Zaldivar ("Natividad"), 29 y.o., assigned female sex at birth, is new to the [facility] Department of H[facility] and On[facility] and to me.  She was referred by TREVOR Cole, for cancer-genetic risk assessment given her family history of cancer.    If Natividad's maternal grandmother's pancreatic cancer was exocrine, then Natividad's mother would meet current National Comprehensive Cancer Network (NCCN) criteria for germline genetic testing of pancreatic cancer susceptibility genes based on Natividad's maternal grandmother's history of pancreatic cancer.  Additionally, if Natividad's maternal grandmother's breast cancer was bilateral, and/or of triple-negative histology, and/or diagnosed at an early age, Natividad herself would been current NCCN criteria for germline genetic testing of breast cancer susceptibility genes.  In either case, if Natividad's mother has undergone testing of all appropriate genes, with negative results, such testing would not be indicated for Natividad at this time.    Cancer Genetics  Germline cancer-genetic testing is the testing of genes associated with cancer, known as cancer susceptibility genes.  Just as these genes are inherited from the parents--one copy of each gene from each parent--mutations in these genes can be inherited, as well.  A mutation in a cancer susceptibility gene adversely affects the gene's ability to prevent cancer; therefore, carriers of cancer susceptibility gene mutations may be at increased risk for certain cancers.     Causes of Cancer  Only approximately 5%-10% of cancers are caused by an inherited cancer susceptibility gene mutation; rather, the majority of cancers are sporadic.  Causes of sporadic cancer may include environmental risk factors, lifestyle risk factors, and non-modifiable risk factors.  Family members often share not only genetics but also " other risk factors, including environmental and lifestyle risk factors, so cancers can be familial.     Potential Results of Genetic Testing, and Their Implications  Potential results of genetic testing include positive, negative, and variant of unknown significance (VUS).    Positive:  A positive result indicates the presence of at least one clinically significant gene mutation, and the individual's associated cancer risks vary depending upon the cancer susceptibility gene(s) in which there is/are a mutation(s).  With a positive result, depending upon the specific result and the individual's clinical history, modified risk management may be recommended, including measures for risk reduction and/or surveillance; however, there are not always effective strategies for risk management.  There may be reproductive implications of a positive genetic test result, and there may be cancer-treatment implications of a positive genetic test result.  Negative:  A negative result indicates that no clinically significant mutations were identified in the gene(s) tested.  A negative result does not indicate that that individual cannot develop cancer, and, in fact, that individual may even be at increased risk for cancer based on shared risk factors with affected relatives.  The ability to interpret the meaning of a negative genetic testing result is limited in an individual unaffected with cancer whose affected relatives have not first undergone such testing.  The most informative family member to undergo testing for hereditary cancer syndromes first are those who have personally had cancer.    VUS:  A VUS is a genetic change for which there is not presently enough data for the laboratory to make a determination as to whether the genetic variant is clinically significant.  VUSs are not typically acted upon clinically.       Genetic Mutation Inheritance  When an individual tests positive for a gene mutation, their first-degree  relatives each have a 50% chance of carrying the same mutation, and other, more distant blood relatives may also be at risk of carrying the same mutation.      Genetic Discrimination  Genetic discrimination occurs when an individual is discriminated against on the basis of their genetic information.  The Genetic Information Nondiscrimination Act of 2008 (ANUJA) is [place] federal legislation that provides some protections against the use of an individual's genetic information by their health insurer and by their employer.  Title I of ANUJA prohibits most health insurers from utilizing an individual's genetic information to make decisions regarding insurance eligibility or premium charges; this protection does not apply to health insurance obtained through a job with the [employer], and it may not apply to health insurance obtained through the Federal Employees Health Benefits Plan.  Title II of ANUJA prohibits covered entities, in many cases, from requesting or requiring the genetic information of employees and applicants and from using said information to make employment decisions; this protection does not apply to employers with fewer than 15 employees or to the .  ANUJA does not protect individuals from genetic discrimination by any other type of policy or entity, including but not limited to life insurance, disability insurance, long-term care insurance,  benefits, and [facility] benefits.    Genetic Testing Logistics  An outside laboratory would perform the testing after a blood sample is collected at [facility].  The test is expected to take approximately three weeks to result.  The patient may incur out-of-pocket costs related to genetic testing.  Post-test genetic counseling can be conducted once the genetic testing results are available.                ICD-10-CM ICD-9-CM   1. Encounter for nonprocreative genetic counseling  Z71.83 V26.33   2. Family history of pancreatic cancer  Z80.0 V16.0  "  3. Family history of breast cancer  Z80.3 V16.3   4. At risk for breast cancer  Z91.89 V49.89     1. Encounter for nonprocreative genetic counseling  Natividad Zaldivar ("Natividad"), 29 y.o., assigned female sex at birth, is new to the [facility] Department of [facility] and to me.  She was referred by TREVOR Cole, for cancer-genetic risk assessment given her family history of cancer.    If Natividad's maternal grandmother's pancreatic cancer was exocrine, then Natividad's mother would meet current National Comprehensive Cancer Network (NCCN) criteria for germline genetic testing of pancreatic cancer susceptibility genes based on Natividad's maternal grandmother's history of pancreatic cancer.  Additionally, if Natividad's maternal grandmother's breast cancer was bilateral, and/or of triple-negative histology, and/or diagnosed at an early age, Natividad herself would been current NCCN criteria for germline genetic testing of breast cancer susceptibility genes.  In either case, if Natividad's mother has undergone testing of all appropriate genes, with negative results, such testing would not be indicated for Natividad at this time.    Offered Natividad options of proceeding with hereditary cancer susceptibility gene testing at this time versus delaying/declining such.  Natividad elects to defer genetic testing for hereditary cancer syndromes at this time and was advised to notify me if she desires to proceed in the future; she has my contact information.     2. Family history of pancreatic cancer  - Ambulatory referral/consult to Genetics:  Completed  - See #1    3. Family history of breast cancer  - Ambulatory referral/consult to Genetics:  Completed  - See #1    4. At risk for breast cancer    - Ambulatory referral/consult to Breast Surgery; Future ([facility]) (patient is independently motivated)     Follow-up:  Follow up in about 1 year (around 3/20/2026) for re-discussion regarding genetic testing, if the patient " does not reach out sooner to test.  Follow up sooner as needed/desired, such as if genetic testing is desired.    Questions were encouraged and answered to the patient's satisfaction, and she verbalized understanding of the information and agreement with the plan.  Advised Natividad that pertinent information will be accessible in this visit note for her review.      Kimo Wiggins, DNP, APRN, FNP-BC, AOCNP, CGRA  Nurse Practitioner, Cancer Genetics  Department of Hematology and Oncology  The [facility]        CC:  Rosalva Saenz         Routed to Cancer Genetics Staff:  - Please place recall for 1 year.        Portions of the record may have been created with voice-recognition software.  Occasional wrong-word or sound-a-like substitutions may have occurred due to the inherent limitations of voice-recognition software.  Read the chart carefully, and recognize, using context, where substitutions have occurred.

## 2025-03-26 ENCOUNTER — PATIENT MESSAGE (OUTPATIENT)
Dept: PSYCHIATRY | Facility: CLINIC | Age: 30
End: 2025-03-26
Payer: COMMERCIAL

## 2025-03-27 ENCOUNTER — OFFICE VISIT (OUTPATIENT)
Dept: PSYCHIATRY | Facility: CLINIC | Age: 30
End: 2025-03-27
Payer: COMMERCIAL

## 2025-03-27 ENCOUNTER — LAB VISIT (OUTPATIENT)
Dept: LAB | Facility: HOSPITAL | Age: 30
End: 2025-03-27
Payer: COMMERCIAL

## 2025-03-27 ENCOUNTER — RESULTS FOLLOW-UP (OUTPATIENT)
Dept: INTERNAL MEDICINE | Facility: CLINIC | Age: 30
End: 2025-03-27

## 2025-03-27 DIAGNOSIS — R74.8 ELEVATED LIVER ENZYMES: ICD-10-CM

## 2025-03-27 DIAGNOSIS — F90.2 ADHD (ATTENTION DEFICIT HYPERACTIVITY DISORDER), COMBINED TYPE: Primary | ICD-10-CM

## 2025-03-27 LAB
ALBUMIN SERPL BCP-MCNC: 4 G/DL (ref 3.5–5.2)
ALP SERPL-CCNC: 63 UNIT/L (ref 40–150)
ALT SERPL W/O P-5'-P-CCNC: 25 UNIT/L (ref 10–44)
AST SERPL-CCNC: 16 UNIT/L (ref 11–45)
BILIRUB DIRECT SERPL-MCNC: 0.2 MG/DL (ref 0.1–0.3)
BILIRUB SERPL-MCNC: 0.5 MG/DL (ref 0.1–1)
PROT SERPL-MCNC: 7 GM/DL (ref 6–8.4)

## 2025-03-27 PROCEDURE — 36415 COLL VENOUS BLD VENIPUNCTURE: CPT

## 2025-03-27 PROCEDURE — 80076 HEPATIC FUNCTION PANEL: CPT

## 2025-03-27 RX ORDER — LISDEXAMFETAMINE DIMESYLATE 40 MG/1
40 CAPSULE ORAL DAILY
Qty: 30 CAPSULE | Refills: 0 | Status: SHIPPED | OUTPATIENT
Start: 2025-03-27

## 2025-03-27 NOTE — PROGRESS NOTES
Outpatient Psychiatry Follow-Up Visit (MD/NP)    3/27/2025    The patient location is: EPIC address on file, [place]   The chief complaint leading to consultation is: ADHD, adjustment    Visit type: audiovisual    Face to Face time with patient: 24 minutes   32 minutes of total time spent on the encounter, which includes face to face time and non-face to face time preparing to see the patient (eg, review of tests), Obtaining and/or reviewing separately obtained history, Documenting clinical information in the electronic or other health record, Independently interpreting results (not separately reported) and communicating results to the patient/family/caregiver, or Care coordination (not separately reported).         Each patient to whom he or she provides medical services by telemedicine is:  (1) informed of the relationship between the physician and patient and the respective role of any other health care provider with respect to management of the patient; and (2) notified that he or she may decline to receive medical services by telemedicine and may withdraw from such care at any time.    Notes:      Clinical Status of Patient:  Outpatient (Ambulatory)    Chief Complaint:  Natividad Zaldivar is a 29 y.o. female who presents today for follow-up of anxiety and attention problems.  Met with patient.      Interval History and Content of Current Session:  Interim Events/Subjective Report/Content of Current Session:     Last seen for initial evaluatoin 12/30/2024.     Patient reports a history of anxiety. No history of requiring additional medication management support for anxiety.      Has been seeing Milka Hua LPC for over a year for psychotherapy. Initially sought care for assistance with grief as father was passing from esophageal cancer.      Found therapy to be incredibly helpful, and continued to see Milka once grief symptoms resolved.     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      Has been discussing with Milka over time traits of concern for ADHD.      Remembers in elementary school tested for concerns with "working memory", attention, focus, and memory. Received accommodations for extended time with testing until Jorge year of High School.     Admits senior year did not renew accommodations due to feeling other than.     When entering law school, noticed she significantly struggled with getting through courses. If classes were longer than an hour, would feel restless, needing to leave classroom.      Considered getting tested again at that time, but was concerned the extra time in school would make it more difficult for her to pass the Bar exam.      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 Describes this transition as difficult as she is struggling more with executive dysfunction due to tasks associated with this position.      Executive dysfunction struggles carry over into personal life. Moved in May, and is still working on unpacking.     Started vyvanse 20mg.     End of November did an e-visit and increased Vyvanse to 30 mg.     Will be switching insurances soon.    12/2024 reported she tolerated Vyvanse well with more time.     Initially experienced nausea, but had since resolved. Noted dry mouth and increased thirst initially, but has improved with more time.     Admitted she often runs dehydrated so has found this to be helpful focusing on adequate hydration.     Noted improvements in attention and focus. Had found the 30 mg dose to be more effective and longer lasting. Decreased impulsivity.     "I didn't realize how hyperactive I was until I wasn't"    Noticeable difference by peers and family.     Had noticed additional benefits in binge eating "quieting the food noise."     Taking around 7:30 - 8 AM and lasting until around 4:30 in afternoon.     Admitted schedule this month and responsibilities have been " "different due to several trainings and holidays.     Had been able to miss caffeine several days, which she reports "is unheard of" for her.     Described feeling more calm.     "I am a much better  on it."     Decreased impulse shopping.     Sleep had been more regulated with more time on medications.     Started Wegovy in November. Tolerating well.     Had continued to meet with Milka for psychotherapy.     Discussed dietary factors that could improve additional attention and focus. Recommend 30 grams or more of protein in AM for mood stabilization, attention and focus, and satiety.     Continued medication unchanged.    ----- Presents today reporting she has continued to tolerate Vyvanse well.    However with more time feeling the effects are more subtle.    Will be starting a new job in a week. So has taken Vyvanse less while in break between jobs.     Discussed impact on appetite Wegovy has had that had impaired her consistency of frequent meals for a while.     Has since been able to make adjustments and routine to improve consistent nutrition.     Notes in new job there will be a high deductible plan.     Sleep stable.     Denies SI/HI/AVH.            Psychotherapy:  Target symptoms: distractability, lack of focus  Why chosen therapy is appropriate versus another modality: relevant to diagnosis  Outcome monitoring methods: self-report, observation  Therapeutic intervention type: insight oriented psychotherapy, supportive psychotherapy  Topics discussed/themes: building skills sets for symptom management, symptom recognition, life stage transitional issues  The patient's response to the intervention is accepting. The patient's progress toward treatment goals is excellent.   Duration of intervention: 6 minutes.    Review of Systems   PSYCHIATRIC: Pertinant items are noted in the narrative.  CONSTITUTIONAL: No weight gain or loss.   MUSCULOSKELETAL: No pain or stiffness of the joints.  NEUROLOGIC: No " weakness, sensory changes, seizures, confusion, memory loss, tremor or other abnormal movements.  ENDOCRINE: No polydipsia or polyuria.  INTEGUMENTARY: No rashes or lacerations.  EYES: No exophthalmos, jaundice or blindness.  ENT: No dizziness, tinnitus or hearing loss.  RESPIRATORY: No shortness of breath.  CARDIOVASCULAR: No tachycardia or chest pain.  GASTROINTESTINAL: No nausea, vomiting, pain, constipation or diarrhea.  GENITOURINARY: No frequency, dysuria or sexual dysfunction.  HEMATOLOGIC/LYMPHATIC: No excessive bleeding, prolonged or excessive bleeding after dental extraction/injury.  ALLERGIC/IMMUNOLOGIC: No allergic response to materials, foods or animals at this time.    Past Medical, Family and Social History: The patient's past medical, family and social history have been reviewed and updated as appropriate within the electronic medical record - see encounter notes.    Compliance: yes    Side effects: None    Risk Parameters:  Patient reports no suicidal ideation  Patient reports no homicidal ideation  Patient reports no self-injurious behavior  Patient reports no violent behavior    Exam (detailed: at least 9 elements; comprehensive: all 15 elements)   Constitutional  Vitals:  Most recent vital signs, dated less than 90 days prior to this appointment, were reviewed.   There were no vitals filed for this visit.    /70  HR 83     General:  unremarkable, age appropriate     Musculoskeletal  Muscle Strength/Tone:  not examined   Gait & Station:  non-ataxic     Psychiatric  Speech:  no latency; no press   Mood & Affect:  steady  congruent and appropriate   Thought Process:  normal and logical   Associations:  intact   Thought Content:  normal, no suicidality, no homicidality, delusions, or paranoia   Insight:  intact, has awareness of illness   Judgement: behavior is adequate to circumstances   Orientation:  grossly intact   Memory: intact for content of interview   Language: grossly intact    Attention Span & Concentration:  able to focus   Fund of Knowledge:  intact and appropriate to age and level of education     Assessment and Diagnosis   Status/Progress: Based on the examination today, the patient's problem(s) is/are inadequately controlled.  New problems have been presented today.   Co-morbidities, Diagnostic uncertainty, and Lack of compliance are not complicating management of the primary condition.  There are no active rule-out diagnoses for this patient at this time.     General Impression: Natividad Zaldivar is a 29 year old female presenting to follow up after establishing care for evaluation and management of executive dysfunction and adjustment anxiety.      Meets criteria for ADHD as suspected by referring therapist.      Low risk to start trial of stimulant medication, tolerating well.      Discussed in length risks and benefits in addition to follow up requirements required for continued controlled substance prescribing.          ICD-10-CM ICD-9-CM   1. ADHD (attention deficit hyperactivity disorder), combined type  F90.2 314.01         Intervention/Counseling/Treatment Plan   Medication Management: Increase Vyvanse to 40 mg for ADHD symptoms. Two additional scripts post dated.    reviewed no flagged dispensing.   Discussed with patient informed consent, risks vs. benefits, alternative treatments, side effect profile and the inherent unpredictability of individual responses to these treatments. The patient expresses understanding of the above and displays the capacity to agree with this current plan and had no other questions.  Encouraged Patient to keep future appointments.   Take medications as prescribed and abstain from substance abuse.   Safety plan reviewed with patient for worsening condition or suicidal ideations. In the event of an emergency patient was advised to go to the emergency room.      Return to Clinic: 3 months with continued stability ok to extend to 6 months               Visit today included increased complexity associated with the care of the episodic problem adhd, anxiety addressed and managing the longitudinal care of the patient due to the serious and/or complex managed problem(s) adhd anxiety.

## 2025-03-28 ENCOUNTER — PATIENT MESSAGE (OUTPATIENT)
Dept: OBSTETRICS AND GYNECOLOGY | Facility: CLINIC | Age: 30
End: 2025-03-28

## 2025-03-28 ENCOUNTER — OFFICE VISIT (OUTPATIENT)
Dept: OBSTETRICS AND GYNECOLOGY | Facility: CLINIC | Age: 30
End: 2025-03-28
Payer: COMMERCIAL

## 2025-03-28 VITALS — BODY MASS INDEX: 30.55 KG/M2 | HEIGHT: 62 IN | WEIGHT: 166 LBS

## 2025-03-28 DIAGNOSIS — K76.0 FATTY LIVER: ICD-10-CM

## 2025-03-28 DIAGNOSIS — E66.01 CLASS 2 SEVERE OBESITY DUE TO EXCESS CALORIES WITH SERIOUS COMORBIDITY AND BODY MASS INDEX (BMI) OF 38.0 TO 38.9 IN ADULT: Primary | ICD-10-CM

## 2025-03-28 DIAGNOSIS — E66.812 CLASS 2 SEVERE OBESITY DUE TO EXCESS CALORIES WITH SERIOUS COMORBIDITY AND BODY MASS INDEX (BMI) OF 38.0 TO 38.9 IN ADULT: ICD-10-CM

## 2025-03-28 DIAGNOSIS — E28.2 PCOS (POLYCYSTIC OVARIAN SYNDROME): ICD-10-CM

## 2025-03-28 DIAGNOSIS — E28.2 PCOS (POLYCYSTIC OVARIAN SYNDROME): Primary | ICD-10-CM

## 2025-03-28 DIAGNOSIS — E66.812 CLASS 2 SEVERE OBESITY DUE TO EXCESS CALORIES WITH SERIOUS COMORBIDITY AND BODY MASS INDEX (BMI) OF 38.0 TO 38.9 IN ADULT: Primary | ICD-10-CM

## 2025-03-28 DIAGNOSIS — E66.01 CLASS 2 SEVERE OBESITY DUE TO EXCESS CALORIES WITH SERIOUS COMORBIDITY AND BODY MASS INDEX (BMI) OF 38.0 TO 38.9 IN ADULT: ICD-10-CM

## 2025-03-28 RX ORDER — TIRZEPATIDE 5 MG/.5ML
5 INJECTION, SOLUTION SUBCUTANEOUS
Qty: 4 PEN | Refills: 3 | Status: SHIPPED | OUTPATIENT
Start: 2025-03-28

## 2025-03-28 NOTE — PROGRESS NOTES
The patient location is: home  The chief complaint leading to consultation is: Follow up weight loss    Visit type: audiovisual    Face to Face time with patient: 10 minutes  15 minutes of total time spent on the encounter, which includes face to face time and non-face to face time preparing to see the patient (eg, review of tests), Obtaining and/or reviewing separately obtained history, Documenting clinical information in the electronic or other health record, Independently interpreting results (not separately reported) and communicating results to the patient/family/caregiver, or Care coordination (not separately reported).         Each patient to whom he or she provides medical services by telemedicine is:  (1) informed of the relationship between the physician and patient and the respective role of any other health care provider with respect to management of the patient; and (2) notified that he or she may decline to receive medical services by telemedicine and may withdraw from such care at any time.    Notes:    Subjective:      Natividad Zaldivar is a 29 y.o. female with history of PCOS and fatty liver who presents for weight loss follow up. She is taking Wegovy 0.5mg SC weekly. She tried to increase to 1.0mg but decreased appetite with Vyvanse. She had a hard time tolerating and admits to not eating enough. Went back to 0.5mg and tolerating better. Elevated LFT with PCP earlier this month. Stopped supplements and is back to WNL. Questions about collagen and nutrafol supplements. She is changing jobs next month and not sure of coverage moving forward.    Routine Screening Labs: 3/11/2025    Lab Visit on 03/27/2025   Component Date Value Ref Range Status    Protein Total 03/27/2025 7.0  6.0 - 8.4 gm/dL Final    Albumin 03/27/2025 4.0  3.5 - 5.2 g/dL Final    Bilirubin Total 03/27/2025 0.5  0.1 - 1.0 mg/dL Final    Bilirubin Direct 03/27/2025 0.2  0.1 - 0.3 mg/dL Final    ALP 03/27/2025 63  40 - 150 unit/L Final     AST 03/27/2025 16  11 - 45 unit/L Final    ALT 03/27/2025 25  10 - 44 unit/L Final   Lab Visit on 03/11/2025   Component Date Value Ref Range Status    WBC 03/11/2025 8.55  3.90 - 12.70 K/uL Final    RBC 03/11/2025 4.51  4.00 - 5.40 M/uL Final    Hemoglobin 03/11/2025 14.0  12.0 - 16.0 g/dL Final    Hematocrit 03/11/2025 41.6  37.0 - 48.5 % Final    MCV 03/11/2025 92  82 - 98 fL Final    MCH 03/11/2025 31.0  27.0 - 31.0 pg Final    MCHC 03/11/2025 33.7  32.0 - 36.0 g/dL Final    RDW 03/11/2025 12.8  11.5 - 14.5 % Final    Platelets 03/11/2025 331  150 - 450 K/uL Final    MPV 03/11/2025 10.0  9.2 - 12.9 fL Final    Immature Granulocytes 03/11/2025 0.2  0.0 - 0.5 % Final    Gran # (ANC) 03/11/2025 4.4  1.8 - 7.7 K/uL Final    Immature Grans (Abs) 03/11/2025 0.02  0.00 - 0.04 K/uL Final    Lymph # 03/11/2025 3.5  1.0 - 4.8 K/uL Final    Mono # 03/11/2025 0.5  0.3 - 1.0 K/uL Final    Eos # 03/11/2025 0.1  0.0 - 0.5 K/uL Final    Baso # 03/11/2025 0.06  0.00 - 0.20 K/uL Final    nRBC 03/11/2025 0  0 /100 WBC Final    Gran % 03/11/2025 50.9  38.0 - 73.0 % Final    Lymph % 03/11/2025 40.4  18.0 - 48.0 % Final    Mono % 03/11/2025 6.3  4.0 - 15.0 % Final    Eosinophil % 03/11/2025 1.5  0.0 - 8.0 % Final    Basophil % 03/11/2025 0.7  0.0 - 1.9 % Final    Differential Method 03/11/2025 Automated   Final    Sodium 03/11/2025 139  136 - 145 mmol/L Final    Potassium 03/11/2025 3.9  3.5 - 5.1 mmol/L Final    Chloride 03/11/2025 106  95 - 110 mmol/L Final    CO2 03/11/2025 21 (L)  23 - 29 mmol/L Final    Glucose 03/11/2025 74  70 - 110 mg/dL Final    BUN 03/11/2025 11  6 - 20 mg/dL Final    Creatinine 03/11/2025 0.8  0.5 - 1.4 mg/dL Final    Calcium 03/11/2025 9.9  8.7 - 10.5 mg/dL Final    Total Protein 03/11/2025 7.4  6.0 - 8.4 g/dL Final    Albumin 03/11/2025 4.0  3.5 - 5.2 g/dL Final    Total Bilirubin 03/11/2025 0.3  0.1 - 1.0 mg/dL Final    Alkaline Phosphatase 03/11/2025 64  40 - 150 U/L Final    AST 03/11/2025 35  10 - 40  U/L Final    ALT 03/11/2025 89 (H)  10 - 44 U/L Final    eGFR 03/11/2025 >60.0  >60 mL/min/1.73 m^2 Final    Anion Gap 03/11/2025 12  8 - 16 mmol/L Final    Hemoglobin A1C 03/11/2025 5.4  4.0 - 5.6 % Final    Estimated Avg Glucose 03/11/2025 108  68 - 131 mg/dL Final    Cholesterol 03/11/2025 225 (H)  120 - 199 mg/dL Final    Triglycerides 03/11/2025 219 (H)  30 - 150 mg/dL Final    HDL 03/11/2025 51  40 - 75 mg/dL Final    LDL Cholesterol 03/11/2025 130.2  63.0 - 159.0 mg/dL Final    HDL/Cholesterol Ratio 03/11/2025 22.7  20.0 - 50.0 % Final    Total Cholesterol/HDL Ratio 03/11/2025 4.4  2.0 - 5.0 Final    Non-HDL Cholesterol 03/11/2025 174  mg/dL Final    TSH 03/11/2025 2.240  0.400 - 4.000 uIU/mL Final   Office Visit on 03/01/2025   Component Date Value Ref Range Status    POC Molecular Influenza A Ag 03/01/2025 Negative  Negative Final    POC Molecular Influenza B Ag 03/01/2025 Negative  Negative Final     Acceptable 03/01/2025 Yes   Final    SARS Coronavirus 2 Antigen 03/01/2025 Negative  Negative, Presumptive Negative Final     Acceptable 03/01/2025 Yes   Final       Past Medical History:   Diagnosis Date    Acne     Class 2 obesity in adult     Hepatic steatosis 04/19/2022    Menorrhagia     Therapy      Past Surgical History:   Procedure Laterality Date    WISDOM TOOTH EXTRACTION  07/2015     Social History[1]  Family History   Problem Relation Name Age of Onset    Skin lesion Mother Lulu         a couple; noncancerous    Breast lesion Mother Lulu         removed, noncancerous    Colonic polyp Mother Lulu 53 - 57        pt believes    Cholelithiasis Mother Lulu     Diabetes Mother Lulu     Anxiety disorder Father Sony     ADD / ADHD Father Sony     Diabetes Father Sony     Liver disease Father Sony         fatty liver     Stroke Father Sony         intraoperative    Cancer Father Sony 64        esophageal vs stomach origin (opinion varied by doctors) - at [facility]     "Pancreatic cancer Maternal Grandmother Myrna 74        very aggressive; subtype unk    Liver disease Maternal Grandmother Myrna         fatty liver     Breast cancer Maternal Grandmother Myrna         at unk age (72y or younger--not "super young"); unilat vs bilat unk    Heart attack Maternal Grandfather      Cancer Paternal Grandfather Johnny 77        throat/mouth    Leukemia Paternal Uncle Reece 70        subtype unk    Breast cancer Other      Celiac disease Neg Hx      Colon cancer Neg Hx      Colon polyps Neg Hx      Crohn's disease Neg Hx      Cystic fibrosis Neg Hx      Hemochromatosis Neg Hx      Inflammatory bowel disease Neg Hx      Irritable bowel syndrome Neg Hx      Liver cancer Neg Hx      Rectal cancer Neg Hx      Stomach cancer Neg Hx      Ulcerative colitis Neg Hx      Neville's disease Neg Hx      Ovarian cancer Neg Hx       OB History    Para Term  AB Living   0 0 0 0 0 0   SAB IAB Ectopic Multiple Live Births   0 0 0 0    Obstetric Comments   Menarche 10     Current Medications[2]    Review of Systems:  General: No fever, chills.  Chest: No chest pain, shortness of breath, or palpitations.  Breast: No pain, masses, or nipple discharge.  Vulva: No pain, lesions, or itching.  Vagina: No relaxation, itching, discharge, or lesions.  Abdomen: No pain, nausea, vomiting, diarrhea, or constipation.  Urinary: No incontinence, nocturia, frequency, or dysuria.  Extremities:  No leg cramps, edema, or calf pain.  Neurologic: No headaches, dizziness, or visual changes.    Objective:     Vitals:    25 0758   Weight: 75.3 kg (166 lb)   Height: 5' 2" (1.575 m)     Body mass index is 30.36 kg/m².    PHYSICAL EXAM:  APPEARANCE: Well nourished, well developed, in no acute distress.  AFFECT: WNL, alert and oriented x 3      Assessment:      PCOS (polycystic ovarian syndrome)    Fatty liver    Class 2 severe obesity due to excess calories with serious comorbidity and body mass index (BMI) " of 38.0 to 38.9 in adult  -     tirzepatide, weight loss, (ZEPBOUND) 5 mg/0.5 mL PnIj; Inject 5 mg into the skin every 7 days.  Dispense: 4 Pen; Refill: 3        Plan:   Continue low glycemic diet with lean protein at each meal.  Avoid skipping meals.  Maintain hydration.  D/c Wegovy 0.5mg SC weekly  Start Zepbound 5mg SC weekly  If not covered by her new insurance, discussed options of paying out of pocket for vials of Zepbound from [employer] or compounded tirzepatide ([facility]).  She will let me know if needed.  Discussed supplements.   Follow up in 3 months    Instructed patient to call if she experiences any side effects or has any questions.    I spent a total of 15 minutes on the day of the visit.This includes face to face time and non-face to face time preparing to see the patient (eg, review of tests), obtaining and/or reviewing separately obtained history, documenting clinical information in the electronic or other health record, independently interpreting results and communicating results to the patient/family/caregiver, or care coordinator.        [1]   Social History  Tobacco Use    Smoking status: Never     Passive exposure: Never    Smokeless tobacco: Never   Substance Use Topics    Alcohol use: Yes     Alcohol/week: 1.0 standard drink of alcohol     Types: 1 Glasses of wine per week     Comment: 3-4 servings per month, no daily or heavy alcohol use    Drug use: Never   [2]   Current Outpatient Medications:     drospirenone-ethinyl estradioL (KHADIJAH) 3-0.02 mg per tablet, Take 1 tablet by mouth once daily., Disp: 84 tablet, Rfl: 3    lisdexamfetamine (VYVANSE) 30 MG capsule, Take 1 capsule (30 mg total) by mouth every morning., Disp: 30 capsule, Rfl: 0    lisdexamfetamine (VYVANSE) 40 MG Cap, Take 1 capsule (40 mg total) by mouth once daily., Disp: 30 capsule, Rfl: 0    omeprazole (PRILOSEC) 40 MG capsule, Take 1 capsule (40 mg total) by mouth once daily. (Patient not taking: Reported on 3/11/2025), Disp:  30 capsule, Rfl: 1    semaglutide, weight loss, (WEGOVY) 0.5 mg/0.5 mL PnIj, Inject 0.5 mg into the skin every 7 days. (Patient not taking: Reported on 3/11/2025), Disp: 2 mL, Rfl: 3    spironolactone (ALDACTONE) 100 MG tablet, Take 100 mg by mouth every evening., Disp: , Rfl:     tirzepatide, weight loss, (ZEPBOUND) 5 mg/0.5 mL PnIj, Inject 5 mg into the skin every 7 days., Disp: 4 Pen, Rfl: 3

## 2025-04-10 RX ORDER — TIRZEPATIDE 5 MG/.5ML
5 INJECTION, SOLUTION SUBCUTANEOUS
Qty: 2 ML | Refills: 3 | Status: SHIPPED | OUTPATIENT
Start: 2025-04-10

## 2025-04-23 ENCOUNTER — E-VISIT (OUTPATIENT)
Dept: PSYCHIATRY | Facility: CLINIC | Age: 30
End: 2025-04-23
Payer: COMMERCIAL

## 2025-04-23 ENCOUNTER — PATIENT MESSAGE (OUTPATIENT)
Dept: PSYCHIATRY | Facility: CLINIC | Age: 30
End: 2025-04-23
Payer: COMMERCIAL

## 2025-04-23 DIAGNOSIS — F90.2 ADHD (ATTENTION DEFICIT HYPERACTIVITY DISORDER), COMBINED TYPE: Primary | ICD-10-CM

## 2025-04-23 DIAGNOSIS — F90.2 ADHD (ATTENTION DEFICIT HYPERACTIVITY DISORDER), COMBINED TYPE: ICD-10-CM

## 2025-04-28 RX ORDER — LISDEXAMFETAMINE DIMESYLATE 30 MG/1
30 CAPSULE ORAL EVERY MORNING
Qty: 30 CAPSULE | Refills: 0 | Status: SHIPPED | OUTPATIENT
Start: 2025-05-28

## 2025-04-28 RX ORDER — LISDEXAMFETAMINE DIMESYLATE 30 MG/1
30 CAPSULE ORAL EVERY MORNING
Qty: 30 CAPSULE | Refills: 0 | Status: SHIPPED | OUTPATIENT
Start: 2025-04-28

## 2025-04-28 NOTE — PROGRESS NOTES
Patient ID: Natividad Zaldivar is a 29 y.o. female.    Chief Complaint: ADD (Entered automatically based on patient selection in [employer].)    The patient initiated a request through [employer] on 4/23/2025 for evaluation and management with a chief complaint of ADD (Entered automatically based on patient selection in [employer].)     I evaluated the questionnaire submission on 4/28/2025.    Ohs Peq Evisit Adhd    4/27/2025 11:28 AM CDT - Filed by Patient   Do you agree to participate in an E-Visit? Yes   If you have any of the following symptoms, please present to your local emergency room or call 911: I acknowledge   Choose the state of your primary residence [place]   Do you have any of the following pregnancy-related conditions? None   The following vitals are required in order to proceed    Systolic Blood Pressure: 122   Diastolic Blood Pressure: 70   Weight: 157   Height: 62   Pulse: 70   Reason for E-Visit Medication change   What is the name of the medication(s)?  Vyvanse   How often are you supposed to take your medication? 1 time daily   What is your current dose? The dose is usually listed in milligrams or milliliters on the label. 40mg   Are you taking it as prescribed? Yes   Do you have any of the following side effects? None   How often do you take your medication as prescribed? Everyday   Have any of the following kept you from taking your medications as prescribed? (Select all that apply) Forget   Would you like to change or continue your medication? Continue medication   Which option below best describes the reason for your request?  I am out of refills and need more   What symptoms do you have? Inattention;  Hyperactivity;  Impulsivity;  Trouble staying organized;  Forgetfulness;  Restlessness;  Difficulty following instructions;  Poor time management;  Lack of focus;  Trouble with multitasking   How would you describe your symptoms of Attention Deficit Disorder (ADD)? Improving   Have you been diagnosed  with any new heart condition? No   Does anyone in your immediate family have a history of sudden cardiac arrest at a young age (younger than 50)? No   Does anyone in your immediate family have any of the following heart conditions: Hypertrophic Cardiomyopathy, Prolonged QT Syndrome, Brugada Syndrome or Rios Parkinson White Syndrome? No   Provide any additional information you feel is important. I do want to stay on vyvanse, however, sometimes i feel like the 40mg makes me more hyper than the 30mg. However the 30mg does not last as ling as the 40mg. Im not sure if the effects im feeling on the 40mg can be managed with diet, or of its too much.   Please attach any relevant images or files          Encounter Diagnosis   Name Primary?    ADHD (attention deficit hyperactivity disorder), combined type         No orders of the defined types were placed in this encounter.     Medications Ordered This Encounter   Medications    lisdexamfetamine (VYVANSE) 30 MG capsule     Sig: Take 1 capsule (30 mg total) by mouth every morning.     Dispense:  30 capsule     Refill:  0    lisdexamfetamine (VYVANSE) 30 MG capsule     Sig: Take 1 capsule (30 mg total) by mouth every morning.     Dispense:  30 capsule     Refill:  0        No follow-ups on file.      E-Visit Time Tracking:    Day 1 Time (in minutes): 8    Total Time (in minutes): 8

## 2025-05-13 ENCOUNTER — PATIENT MESSAGE (OUTPATIENT)
Dept: PSYCHIATRY | Facility: CLINIC | Age: 30
End: 2025-05-13

## 2025-05-13 ENCOUNTER — TELEPHONE (OUTPATIENT)
Dept: HEMATOLOGY/ONCOLOGY | Facility: CLINIC | Age: 30
End: 2025-05-13

## 2025-05-13 NOTE — TELEPHONE ENCOUNTER
Called patient and left message to confirm the virtual visit on 5/20/25 and to complete the questionnaire.

## 2025-05-21 DIAGNOSIS — F90.2 ADHD (ATTENTION DEFICIT HYPERACTIVITY DISORDER), COMBINED TYPE: Primary | ICD-10-CM

## 2025-05-21 RX ORDER — LISDEXAMFETAMINE DIMESYLATE 40 MG/1
40 CAPSULE ORAL DAILY
Qty: 30 CAPSULE | Refills: 0 | Status: SHIPPED | OUTPATIENT
Start: 2025-05-21

## 2025-06-18 ENCOUNTER — ON-DEMAND VIRTUAL (OUTPATIENT)
Dept: URGENT CARE | Facility: CLINIC | Age: 30
End: 2025-06-18
Payer: COMMERCIAL

## 2025-06-18 DIAGNOSIS — N39.0 URINARY TRACT INFECTION WITHOUT HEMATURIA, SITE UNSPECIFIED: Primary | ICD-10-CM

## 2025-06-18 RX ORDER — NITROFURANTOIN 25; 75 MG/1; MG/1
100 CAPSULE ORAL 2 TIMES DAILY
Qty: 14 CAPSULE | Refills: 0 | Status: SHIPPED | OUTPATIENT
Start: 2025-06-18 | End: 2025-06-25

## 2025-06-19 ENCOUNTER — PATIENT MESSAGE (OUTPATIENT)
Dept: PSYCHIATRY | Facility: CLINIC | Age: 30
End: 2025-06-19
Payer: COMMERCIAL

## 2025-06-19 DIAGNOSIS — F90.2 ADHD (ATTENTION DEFICIT HYPERACTIVITY DISORDER), COMBINED TYPE: ICD-10-CM

## 2025-06-19 RX ORDER — LISDEXAMFETAMINE DIMESYLATE 40 MG/1
40 CAPSULE ORAL DAILY
Qty: 7 CAPSULE | Refills: 0 | Status: SHIPPED | OUTPATIENT
Start: 2025-06-19

## 2025-06-19 NOTE — PROGRESS NOTES
"Subjective:      Patient ID: Natividad Zaldivar is a 30 y.o. female.    Vitals:  vitals were not taken for this visit.     Chief Complaint: Dysuria      Visit Type: TELE AUDIOVISUAL    Past Medical History:   Diagnosis Date    Acne     Class 2 obesity in adult     Hepatic steatosis 04/19/2022    Menorrhagia     Therapy      Past Surgical History:   Procedure Laterality Date    WISDOM TOOTH EXTRACTION  07/2015     Review of patient's allergies indicates:  No Known Allergies  Medications Ordered Prior to Encounter[1]  Family History   Problem Relation Name Age of Onset    Skin lesion Mother Lulu         a couple; noncancerous    Breast lesion Mother Lulu         removed, noncancerous    Colonic polyp Mother Lulu 53 - 57        pt believes    Cholelithiasis Mother Lulu     Diabetes Mother Lulu     Anxiety disorder Father Sony     ADD / ADHD Father Sony     Diabetes Father Sony     Liver disease Father Sony         fatty liver     Stroke Father Sony         intraoperative    Cancer Father Sony 64        esophageal vs stomach origin (opinion varied by doctors) - at [facility]    Pancreatic cancer Maternal Grandmother Myrna 74        very aggressive; subtype unk    Liver disease Maternal Grandmother Myrna         fatty liver     Breast cancer Maternal Grandmother Myrna         at unk age (72y or younger--not "super young"); unilat vs bilat unk    Heart attack Maternal Grandfather      Cancer Paternal Grandfather Johnny 77        throat/mouth    Leukemia Paternal Uncle Reece 70        subtype unk    Breast cancer Other      Celiac disease Neg Hx      Colon cancer Neg Hx      Colon polyps Neg Hx      Crohn's disease Neg Hx      Cystic fibrosis Neg Hx      Hemochromatosis Neg Hx      Inflammatory bowel disease Neg Hx      Irritable bowel syndrome Neg Hx      Liver cancer Neg Hx      Rectal cancer Neg Hx      Stomach cancer Neg Hx      Ulcerative colitis Neg Hx      Neville's disease Neg Hx      Ovarian cancer Neg Hx   "       Medications Ordered                [id number] - [place] - [address] AT [address] [facility] [address]   [address] [address]    Telephone: [phone]   Fax: [phone]   Hours: Not open 24 hours                         E-Prescribed (1 of 1)              nitrofurantoin, macrocrystal-monohydrate, (MACROBID) 100 MG capsule    Sig: Take 1 capsule (100 mg total) by mouth 2 (two) times daily. for 7 days       Start: 6/18/25     Quantity: 14 capsule Refills: 0                           No questionnaires on file.    Presents with symptoms of dysuria, urinary frequency and urgency.  No N/V, fever, or hematuria.  Onset about a week ago; intermittent symptoms, now consistent    Two patient identifiers were used-name was repeated verbally as well as date of birth.  The patient was located in their home in the [facility].          Constitution: Negative for chills and fever.   HENT:  Negative for sinus pain and sinus pressure.    Neck: Negative for neck pain, neck stiffness and painful lymph nodes.   Cardiovascular:  Negative for chest pain and palpitations.   Respiratory:  Negative for cough and shortness of breath.    Gastrointestinal:  Negative for nausea, vomiting and diarrhea.   Endocrine: cold intolerance and heat intolerance.   Genitourinary:  Positive for dysuria, frequency and urgency. Negative for flank pain and hematuria.   Musculoskeletal:  Negative for joint pain, back pain and muscle cramps.   Skin:  Negative for rash, lesion and hives.   Allergic/Immunologic: Negative for hives and itching.   Neurological:  Negative for dizziness and headaches.   Hematologic/Lymphatic: Negative for swollen lymph nodes and history of bleeding disorder.   Psychiatric/Behavioral:  Negative for nervous/anxious and sleep disturbance. The patient is not nervous/anxious.         Objective:   The physical exam was conducted virtually.  Physical Exam   Constitutional:  She is oriented to person, place, and time. No distress.   HENT:   Head: Normocephalic and atraumatic.   Neck: Neck supple.   Pulmonary/Chest: Effort normal. No respiratory distress.   Abdominal: Normal appearance.   Musculoskeletal: Normal range of motion.         General: Normal range of motion.   Neurological: no focal deficit. She is alert and oriented to person, place, and time.   Skin: Skin is not pale.   Psychiatric: Her behavior is normal. Mood, judgment and thought content normal.       Assessment:     1. Urinary tract infection without hematuria, site unspecified        Plan:       Urinary tract infection without hematuria, site unspecified    Other orders  -     nitrofurantoin, macrocrystal-monohydrate, (MACROBID) 100 MG capsule; Take 1 capsule (100 mg total) by mouth 2 (two) times daily. for 7 days  Dispense: 14 capsule; Refill: 0    Increase intake of fluids; avoid caffeine.  Meds as directed.  F/u with PCP or to ER if symptoms become worse.    You must understand that you've received a [facility] treatment only and that you may be released before all your medical problems are known or treated. You, the patient, will arrange for follow up care as instructed.  If your condition worsens we recommend that you receive another evaluation at an urgent care in person, the emergency room or contact your primary medical clinics after hours call service to discuss your concerns.              Present with the patient at the time of consultation: TELEMED PRESENT WITH PATIENT: None           [1]   Current Outpatient Medications on File Prior to Visit   Medication Sig Dispense Refill    drospirenone-ethinyl estradioL (KHADIJAH) 3-0.02 mg per tablet Take 1 tablet by mouth once daily. 84 tablet 3    lisdexamfetamine (VYVANSE) 30 MG capsule Take 1 capsule (30 mg total) by mouth every morning. 30 capsule 0    lisdexamfetamine (VYVANSE) 30 MG capsule Take 1 capsule (30 mg total) by mouth every morning. 30 capsule 0     lisdexamfetamine (VYVANSE) 40 MG Cap Take 1 capsule (40 mg total) by mouth once daily. 30 capsule 0    spironolactone (ALDACTONE) 100 MG tablet Take 100 mg by mouth every evening.      tirzepatide, weight loss, (ZEPBOUND) 5 mg/0.5 mL Soln Inject 0.5 mLs (5 mg total) into the skin every 7 days. 2 mL 3     No current facility-administered medications on file prior to visit.

## 2025-06-26 ENCOUNTER — OFFICE VISIT (OUTPATIENT)
Dept: PSYCHIATRY | Facility: CLINIC | Age: 30
End: 2025-06-26
Payer: COMMERCIAL

## 2025-06-26 VITALS
SYSTOLIC BLOOD PRESSURE: 119 MMHG | WEIGHT: 145.5 LBS | HEART RATE: 72 BPM | DIASTOLIC BLOOD PRESSURE: 72 MMHG | BODY MASS INDEX: 26.61 KG/M2

## 2025-06-26 DIAGNOSIS — F90.2 ADHD (ATTENTION DEFICIT HYPERACTIVITY DISORDER), COMBINED TYPE: Primary | ICD-10-CM

## 2025-06-26 PROCEDURE — 99999 PR PBB SHADOW E&M-EST. PATIENT-LVL III: CPT | Mod: PBBFAC,,, | Performed by: NURSE PRACTITIONER

## 2025-06-26 PROCEDURE — G2211 COMPLEX E/M VISIT ADD ON: HCPCS | Mod: S$GLB,,, | Performed by: NURSE PRACTITIONER

## 2025-06-26 PROCEDURE — 3008F BODY MASS INDEX DOCD: CPT | Mod: CPTII,S$GLB,, | Performed by: NURSE PRACTITIONER

## 2025-06-26 PROCEDURE — 3044F HG A1C LEVEL LT 7.0%: CPT | Mod: CPTII,S$GLB,, | Performed by: NURSE PRACTITIONER

## 2025-06-26 PROCEDURE — 3078F DIAST BP <80 MM HG: CPT | Mod: CPTII,S$GLB,, | Performed by: NURSE PRACTITIONER

## 2025-06-26 PROCEDURE — 99214 OFFICE O/P EST MOD 30 MIN: CPT | Mod: S$GLB,,, | Performed by: NURSE PRACTITIONER

## 2025-06-26 PROCEDURE — 3074F SYST BP LT 130 MM HG: CPT | Mod: CPTII,S$GLB,, | Performed by: NURSE PRACTITIONER

## 2025-06-26 RX ORDER — DEXTROAMPHETAMINE SACCHARATE, AMPHETAMINE ASPARTATE MONOHYDRATE, DEXTROAMPHETAMINE SULFATE AND AMPHETAMINE SULFATE 2.5; 2.5; 2.5; 2.5 MG/1; MG/1; MG/1; MG/1
10 CAPSULE, EXTENDED RELEASE ORAL EVERY MORNING
Qty: 7 CAPSULE | Refills: 0 | Status: SHIPPED | OUTPATIENT
Start: 2025-06-26 | End: 2025-07-02

## 2025-06-26 NOTE — PROGRESS NOTES
"Outpatient Psychiatry Follow-Up Visit (MD/NP)    6/26/2025      Notes:      Clinical Status of Patient:  Outpatient (Ambulatory)    Chief Complaint:  Natividad Zaldivar is a 30 y.o. female who presents today for follow-up of anxiety and attention problems.  Met with patient.      Interval History and Content of Current Session:  Interim Events/Subjective Report/Content of Current Session:     Last seen 3/27/2025.     Initial evaluation 9/30/2024.     Patient reports a history of anxiety. No history of requiring additional medication management support for anxiety.      Has been seeing Milka Hua LPC for over a year for psychotherapy. Initially sought care for assistance with grief as father was passing from esophageal cancer.      Found therapy to be incredibly helpful, and continued to see Milka once grief symptoms resolved.     Graduated [employer] 2019. . 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      Has been discussing with Milka over time traits of concern for ADHD.      Remembers in elementary school tested for concerns with "working memory", attention, focus, and memory. Received accommodations for extended time with testing until Jorge year of High School.     Admits senior year did not renew accommodations due to feeling other than.     When entering law school, noticed she significantly struggled with getting through courses. If classes were longer than an hour, would feel restless, needing to leave classroom.      Considered getting tested again at that time, but was concerned the extra time in school would make it more difficult for her to pass the Bar exam.      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 Describes this transition as difficult as she is struggling more with executive dysfunction due to tasks associated with this position.      Executive dysfunction struggles carry over into personal life. Moved in May, and is " "still working on unpacking.     Started vyvanse 20mg.     End of November did an e-visit and increased Vyvanse to 30 mg.     Will be switching insurances soon.    12/2024 reported she tolerated Vyvanse well with more time.     Initially experienced nausea, but had since resolved. Noted dry mouth and increased thirst initially, but has improved with more time.     Admitted she often runs dehydrated so has found this to be helpful focusing on adequate hydration.     Noted improvements in attention and focus. Had found the 30 mg dose to be more effective and longer lasting. Decreased impulsivity.     "I didn't realize how hyperactive I was until I wasn't"    Noticeable difference by peers and family.     Had noticed additional benefits in binge eating "quieting the food noise."     Taking around 7:30 - 8 AM and lasting until around 4:30 in afternoon.     Admitted schedule this month and responsibilities have been different due to several trainings and holidays.     Had been able to miss caffeine several days, which she reports "is unheard of" for her.     Described feeling more calm.     "I am a much better  on it."     Decreased impulse shopping.     Sleep had been more regulated with more time on medications.     Started Wegovy in November. Tolerating well.     Had continued to meet with Milka for psychotherapy.     Discussed dietary factors that could improve additional attention and focus. Recommend 30 grams or more of protein in AM for mood stabilization, attention and focus, and satiety.     Continued medication unchanged.    ----- 3/2025 reporting she had continued to tolerate Vyvanse well.    However with more time feeling the effects are more subtle.    To be starting a new job in a week. So had taken Vyvanse less while in break between jobs.     Discussed impact on appetite Wegovy has had that had impaired her consistency of frequent meals for a while.     Had since been able to make adjustments and " routine to improve consistent nutrition.     Noted in new job there will be a high deductible plan.     Increased Vyvanse to 40 mg.     Did an e-visit and decreased dose to 30 mg as interested to return to prior dosing.     Resumed 40 mg dose 5/13/2025 as resumed some from old dose and was tolerating better and less jittery.     ------Presents today reporting with more time concern 40 mg has been less effective.     [facility]. Leading to medication being very expensive.     Interested in a trial of another more affordable option.    Discussed Adderall XR vs Astaryz, pro drug on market that may have coupon available for affordability.      Sleep stable.     Anxiety and mood well managed.     Denies SI/HI/AVH.            Psychotherapy:  Target symptoms: distractability, lack of focus  Why chosen therapy is appropriate versus another modality: relevant to diagnosis  Outcome monitoring methods: self-report, observation  Therapeutic intervention type: insight oriented psychotherapy, supportive psychotherapy  Topics discussed/themes: building skills sets for symptom management, symptom recognition, life stage transitional issues  The patient's response to the intervention is accepting. The patient's progress toward treatment goals is excellent.   Duration of intervention: 10 minutes.    Review of Systems   PSYCHIATRIC: Pertinant items are noted in the narrative.  CONSTITUTIONAL: No weight gain or loss.   MUSCULOSKELETAL: No pain or stiffness of the joints.  NEUROLOGIC: No weakness, sensory changes, seizures, confusion, memory loss, tremor or other abnormal movements.  ENDOCRINE: No polydipsia or polyuria.  INTEGUMENTARY: No rashes or lacerations.  EYES: No exophthalmos, jaundice or blindness.  ENT: No dizziness, tinnitus or hearing loss.  RESPIRATORY: No shortness of breath.  CARDIOVASCULAR: No tachycardia or chest pain.  GASTROINTESTINAL: No nausea, vomiting, pain,  constipation or diarrhea.  GENITOURINARY: No frequency, dysuria or sexual dysfunction.  HEMATOLOGIC/LYMPHATIC: No excessive bleeding, prolonged or excessive bleeding after dental extraction/injury.  ALLERGIC/IMMUNOLOGIC: No allergic response to materials, foods or animals at this time.    Past Medical, Family and Social History: The patient's past medical, family and social history have been reviewed and updated as appropriate within the electronic medical record - see encounter notes.    Compliance: yes    Side effects: None    Risk Parameters:  Patient reports no suicidal ideation  Patient reports no homicidal ideation  Patient reports no self-injurious behavior  Patient reports no violent behavior    Exam (detailed: at least 9 elements; comprehensive: all 15 elements)   Constitutional  Vitals:  Most recent vital signs, dated less than 90 days prior to this appointment, were reviewed.   Vitals:    06/26/25 0815   BP: 119/72   Pulse: 72   Weight: 66 kg (145 lb 8.1 oz)            General:  unremarkable, age appropriate     Musculoskeletal  Muscle Strength/Tone:  not examined   Gait & Station:  non-ataxic     Psychiatric  Speech:  no latency; no press   Mood & Affect:  steady  congruent and appropriate   Thought Process:  normal and logical   Associations:  intact   Thought Content:  normal, no suicidality, no homicidality, delusions, or paranoia   Insight:  intact, has awareness of illness   Judgement: behavior is adequate to circumstances   Orientation:  grossly intact   Memory: intact for content of interview   Language: grossly intact   Attention Span & Concentration:  able to focus   Fund of Knowledge:  intact and appropriate to age and level of education     Assessment and Diagnosis   Status/Progress: Based on the examination today, the patient's problem(s) is/are inadequately controlled.  New problems have been presented today.   Co-morbidities, Diagnostic uncertainty, and Lack of compliance are not  complicating management of the primary condition.  There are no active rule-out diagnoses for this patient at this time.     General Impression: Natividad Zaldivar is a 30 year old female presenting to follow up after establishing care for evaluation and management of executive dysfunction and adjustment anxiety.      Meets criteria for ADHD as suspected by referring therapist.      Low risk to start trial of stimulant medication, tolerating well.      Discussed in length risks and benefits in addition to follow up requirements required for continued controlled substance prescribing.          ICD-10-CM ICD-9-CM   1. ADHD (attention deficit hyperactivity disorder), combined type  F90.2 314.01           Intervention/Counseling/Treatment Plan   Medication Management: Stop Vyvanse and switch to Adderall XR 10 mg for ADHD symptom management.    reviewed no flagged dispensing.   Discussed with patient informed consent, risks vs. benefits, alternative treatments, side effect profile and the inherent unpredictability of individual responses to these treatments. The patient expresses understanding of the above and displays the capacity to agree with this current plan and had no other questions.  Encouraged Patient to keep future appointments.   Take medications as prescribed and abstain from substance abuse.   Safety plan reviewed with patient for worsening condition or suicidal ideations. In the event of an emergency patient was advised to go to the emergency room.      Return to Clinic: 3 months with continued stability ok to extend to 6 months              Visit today included increased complexity associated with the care of the episodic problem adhd, anxiety addressed and managing the longitudinal care of the patient due to the serious and/or complex managed problem(s) adhd anxiety.

## 2025-06-27 ENCOUNTER — OFFICE VISIT (OUTPATIENT)
Dept: OBSTETRICS AND GYNECOLOGY | Facility: CLINIC | Age: 30
End: 2025-06-27
Payer: COMMERCIAL

## 2025-06-27 VITALS — HEIGHT: 62 IN | WEIGHT: 145 LBS | BODY MASS INDEX: 26.68 KG/M2

## 2025-06-27 DIAGNOSIS — K76.0 FATTY LIVER: ICD-10-CM

## 2025-06-27 DIAGNOSIS — E66.01 CLASS 2 SEVERE OBESITY DUE TO EXCESS CALORIES WITH SERIOUS COMORBIDITY AND BODY MASS INDEX (BMI) OF 38.0 TO 38.9 IN ADULT: ICD-10-CM

## 2025-06-27 DIAGNOSIS — E28.2 PCOS (POLYCYSTIC OVARIAN SYNDROME): Primary | ICD-10-CM

## 2025-06-27 DIAGNOSIS — E66.812 CLASS 2 SEVERE OBESITY DUE TO EXCESS CALORIES WITH SERIOUS COMORBIDITY AND BODY MASS INDEX (BMI) OF 38.0 TO 38.9 IN ADULT: ICD-10-CM

## 2025-06-27 RX ORDER — TIRZEPATIDE 5 MG/.5ML
5 INJECTION, SOLUTION SUBCUTANEOUS
Qty: 2 ML | Refills: 3 | Status: SHIPPED | OUTPATIENT
Start: 2025-06-27

## 2025-06-27 NOTE — PROGRESS NOTES
The patient location is: [place]  The chief complaint leading to consultation is: follow up weight loss    Visit type: audiovisual    Face to Face time with patient: 10 minutes  20 minutes of total time spent on the encounter, which includes face to face time and non-face to face time preparing to see the patient (eg, review of tests), Obtaining and/or reviewing separately obtained history, Documenting clinical information in the electronic or other health record, Independently interpreting results (not separately reported) and communicating results to the patient/family/caregiver, or Care coordination (not separately reported).         Each patient to whom he or she provides medical services by telemedicine is:  (1) informed of the relationship between the physician and patient and the respective role of any other health care provider with respect to management of the patient; and (2) notified that he or she may decline to receive medical services by telemedicine and may withdraw from such care at any time.    Notes:   Subjective:      Natividad Zaldivar is a 30 y.o. female with history of PCOS and fatty liver who presents for weight loss follow up. She was taking Wegovy but insurance changed with job change and weight loss medications no longer covered. Discussed options and wanted to transition to Zepbound paying out of pocket. She is taking Zepbound 5.0mg SC weekly.  Reports less side effects as compared to Wegovy and tolerating well. Continues to have weight loss, but slowing down. She has lost over 50lbs over all. Eating small meals throughout the day. Getting high protein. She is exercising with walking. Plans to add weight training.     Routine Screening Labs: 3/11/2025     No visits with results within 3 Month(s) from this visit.   Latest known visit with results is:   Lab Visit on 03/27/2025   Component Date Value Ref Range Status    Protein Total 03/27/2025 7.0  6.0 - 8.4 gm/dL Final    Albumin 03/27/2025 4.0  " 3.5 - 5.2 g/dL Final    Bilirubin Total 03/27/2025 0.5  0.1 - 1.0 mg/dL Final    Bilirubin Direct 03/27/2025 0.2  0.1 - 0.3 mg/dL Final    ALP 03/27/2025 63  40 - 150 unit/L Final    AST 03/27/2025 16  11 - 45 unit/L Final    ALT 03/27/2025 25  10 - 44 unit/L Final       Past Medical History:   Diagnosis Date    Acne     Class 2 obesity in adult     Hepatic steatosis 04/19/2022    Menorrhagia     Therapy      Past Surgical History:   Procedure Laterality Date    WISDOM TOOTH EXTRACTION  07/2015     Social History[1]  Family History   Problem Relation Name Age of Onset    Skin lesion Mother Lulu         a couple; noncancerous    Breast lesion Mother Lulu         removed, noncancerous    Colonic polyp Mother Lulu 53 - 57        pt believes    Cholelithiasis Mother Lulu     Diabetes Mother Lulu     Anxiety disorder Father Sony     ADD / ADHD Father Sony     Diabetes Father Sony     Liver disease Father Sony         fatty liver     Stroke Father Sony         intraoperative    Cancer Father Sony 64        esophageal vs stomach origin (opinion varied by doctors) - at [facility]    Pancreatic cancer Maternal Grandmother Myrna 74        very aggressive; subtype unk    Liver disease Maternal Grandmother Myrna         fatty liver     Breast cancer Maternal Grandmother Myrna         at unk age (72y or younger--not "super young"); unilat vs bilat unk    Heart attack Maternal Grandfather      Cancer Paternal Grandfather Johnny 77        throat/mouth    Leukemia Paternal Uncle Reece 70        subtype unk    Breast cancer Other      Celiac disease Neg Hx      Colon cancer Neg Hx      Colon polyps Neg Hx      Crohn's disease Neg Hx      Cystic fibrosis Neg Hx      Hemochromatosis Neg Hx      Inflammatory bowel disease Neg Hx      Irritable bowel syndrome Neg Hx      Liver cancer Neg Hx      Rectal cancer Neg Hx      Stomach cancer Neg Hx      Ulcerative colitis Neg Hx      Neville's disease Neg Hx      Ovarian cancer " "Neg Hx       OB History    Para Term  AB Living   0 0 0 0 0 0   SAB IAB Ectopic Multiple Live Births   0 0 0 0    Obstetric Comments   Menarche 10     Current Medications[2]    Review of Systems:  General: No fever, chills.  Chest: No chest pain, shortness of breath, or palpitations.  Breast: No pain, masses, or nipple discharge.  Vulva: No pain, lesions, or itching.  Vagina: No relaxation, itching, discharge, or lesions.  Abdomen: No pain, nausea, vomiting, diarrhea, or constipation.  Urinary: No incontinence, nocturia, frequency, or dysuria.  Extremities:  No leg cramps, edema, or calf pain.  Neurologic: No headaches, dizziness, or visual changes.    Objective:     Vitals:    25 0756   Weight: 65.8 kg (145 lb)   Height: 5' 2" (1.575 m)     Body mass index is 26.52 kg/m².    PHYSICAL EXAM:  APPEARANCE: Well nourished, well developed, in no acute distress.  AFFECT: WNL, alert and oriented x 3      Assessment:      PCOS (polycystic ovarian syndrome)    Fatty liver    Class 2 severe obesity due to excess calories with serious comorbidity and body mass index (BMI) of 38.0 to 38.9 in adult  -     tirzepatide, weight loss, (ZEPBOUND) 5 mg/0.5 mL Soln; Inject 0.5 mLs (5 mg total) into the skin every 7 days.  Dispense: 2 mL; Refill: 3        Plan:   Continue low glycemic diet with lean protein at each meal.  Maintain hydration.  Continue walking for exercise  Plans to add strength workouts  Continue Zepbound 5mg SC weekly ([facility])  Discussed when would increased the dose.  Follow up in 3 months    Instructed patient to call if she experiences any side effects or has any questions.    I spent a total of 20 minutes on the day of the visit.This includes face to face time and non-face to face time preparing to see the patient (eg, review of tests), obtaining and/or reviewing separately obtained history, documenting clinical information in the electronic or other health record, independently " interpreting results and communicating results to the patient/family/caregiver, or care coordinator.                                [1]   Social History  Tobacco Use    Smoking status: Never     Passive exposure: Never    Smokeless tobacco: Never   Substance Use Topics    Alcohol use: Yes     Alcohol/week: 1.0 standard drink of alcohol     Types: 1 Glasses of wine per week     Comment: 3-4 servings per month, no daily or heavy alcohol use    Drug use: Never   [2]   Current Outpatient Medications:     dextroamphetamine-amphetamine (ADDERALL XR) 10 MG 24 hr capsule, Take 1 capsule (10 mg total) by mouth every morning., Disp: 7 capsule, Rfl: 0    drospirenone-ethinyl estradioL (KHADIJAH) 3-0.02 mg per tablet, Take 1 tablet by mouth once daily., Disp: 84 tablet, Rfl: 3    lisdexamfetamine (VYVANSE) 30 MG capsule, Take 1 capsule (30 mg total) by mouth every morning., Disp: 30 capsule, Rfl: 0    lisdexamfetamine (VYVANSE) 30 MG capsule, Take 1 capsule (30 mg total) by mouth every morning., Disp: 30 capsule, Rfl: 0    lisdexamfetamine (VYVANSE) 40 MG Cap, Take 1 capsule (40 mg total) by mouth once daily., Disp: 7 capsule, Rfl: 0    spironolactone (ALDACTONE) 100 MG tablet, Take 100 mg by mouth every evening., Disp: , Rfl:     tirzepatide, weight loss, (ZEPBOUND) 5 mg/0.5 mL Soln, Inject 0.5 mLs (5 mg total) into the skin every 7 days., Disp: 2 mL, Rfl: 3

## 2025-07-02 ENCOUNTER — PATIENT MESSAGE (OUTPATIENT)
Dept: PSYCHIATRY | Facility: CLINIC | Age: 30
End: 2025-07-02
Payer: COMMERCIAL

## 2025-07-02 DIAGNOSIS — F90.2 ADHD (ATTENTION DEFICIT HYPERACTIVITY DISORDER), COMBINED TYPE: Primary | ICD-10-CM

## 2025-07-02 RX ORDER — SERDEXMETHYLPHENIDATE AND DEXMETHYLPHENIDATE 5.2; 26.1 MG/1; MG/1
1 CAPSULE ORAL DAILY
Qty: 30 CAPSULE | Refills: 0 | Status: SHIPPED | OUTPATIENT
Start: 2025-07-02

## 2025-07-17 ENCOUNTER — TELEPHONE (OUTPATIENT)
Dept: HEMATOLOGY/ONCOLOGY | Facility: CLINIC | Age: 30
End: 2025-07-17
Payer: COMMERCIAL

## 2025-07-24 ENCOUNTER — OFFICE VISIT (OUTPATIENT)
Dept: HEMATOLOGY/ONCOLOGY | Facility: CLINIC | Age: 30
End: 2025-07-24
Payer: COMMERCIAL

## 2025-07-24 DIAGNOSIS — Z80.3 FAMILY HISTORY OF BREAST CANCER: ICD-10-CM

## 2025-07-24 DIAGNOSIS — Z91.89 AT RISK FOR BREAST CANCER: Primary | ICD-10-CM

## 2025-07-24 DIAGNOSIS — Z80.0 FAMILY HISTORY OF PANCREATIC CANCER: ICD-10-CM

## 2025-07-24 NOTE — PROGRESS NOTES
"The patient location is:[place]  The chief complaint leading to consultation is: high risk     Visit type: audiovisual      60 minutes of total time spent on the encounter, which includes face to face time and non-face to face time preparing to see the patient (eg, review of tests), Obtaining and/or reviewing separately obtained history, Documenting clinical information in the electronic or other health record, Independently interpreting results (not separately reported) and communicating results to the patient/family/caregiver, or Care coordination (not separately reported).         Each patient to whom he or she provides medical services by telemedicine is:  (1) informed of the relationship between the physician and patient and the respective role of any other health care provider with respect to management of the patient; and (2) notified that he or she may decline to receive medical services by telemedicine and may withdraw from such care at any time.    Notes:       [facility]  [facility]    High Risk Breast Clinic note      Reason For Consultation:   high-risk for breast cancer      Referring Provider:   Kimo Wiggins, [facility]  [address] [place] [address]    Records Obtained: Records of the patients history including those obtained from the referring provider were reviewed and summarized in detail.    HPI:   Natividad Zaldivar is a 30 y.o. who presents for consultation of increased risk of breast cancer.      Today, Feels good and no complaints.   No breast concerns.    Breast imaging- no previous        High Risk Breast cancer specific history:  - Age: 30 y.o.   - Height/Weight:  Estimated body surface area is 1.7 meters squared as calculated from the following:    Height as of 6/27/25: 5' 2" (1.575 m).    Weight as of 6/27/25: 65.8 kg (145 lb).  - There is no height or weight on file to calculate " BMI.  - Breast density per BI-RADS: unknown   - Age at menarche:   10  - Number of pregnancies: ; age of first live birth:    - History of breast feeding:      -Uterus and ovaries intact: Yes  - Menopausal status: premenopausal. Age at menopause, if applicable:      - HRT: No  - Genetic testing:  No but met with Kimo LUDWIG Opts out for now.  - Personal history of cancer: No  - Previous chest radiation exposure between ages 10-30 years old: No  - Personal history of breast biopsy:  No  - Ashkenazi Sabianist Inheritance:  No Other   - Family history of cancer:    Cancer-related family history includes Breast cancer in her maternal grandmother, paternal grandmother, and another family member; Cancer (age of onset: 64) in her father; Cancer (age of onset: 77) in her paternal grandfather; Pancreatic cancer (age of onset: 74) in her maternal grandmother. There is no history of Liver cancer, Rectal cancer, or Ovarian cancer.    Social History   Social History     Tobacco Use    Smoking status: Never     Passive exposure: Never    Smokeless tobacco: Never   Substance Use Topics    Alcohol use: Not Currently     Comment: occasional     Exercise regimen: 3-5 mile walk.   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     Past Medical   Past Medical History:   Diagnosis Date    Acne     Class 2 obesity in adult     Hepatic steatosis 2022    Menorrhagia     Therapy    Problem List[1]  Family History  Family History   Problem Relation Name Age of Onset    Skin lesion Mother Lulu         a couple; noncancerous    Breast lesion Mother Lulu         removed, noncancerous    Colonic polyp Mother Lulu 53 - 57        pt believes    Cholelithiasis Mother Lulu     Diabetes Mother Lulu     Anxiety disorder Father Sony     ADD / ADHD Father Sony     Diabetes Father Sony     Liver disease Father Sony         fatty liver     Stroke Father Sony          "intraoperative    Cancer Father Sony 64        esophageal vs stomach origin (opinion varied by doctors) - at [facility]    Pancreatic cancer Maternal Grandmother Myrna 74        very aggressive; subtype unk    Liver disease Maternal Grandmother Myrna         fatty liver     Breast cancer Maternal Grandmother Myrna         at unk age (72y or younger--not "super young"); unilat vs bilat unk    Heart attack Maternal Grandfather      Breast cancer Paternal Grandmother          likely breast but unknown    Cancer Paternal Grandfather Johnny 77        throat/mouth    Leukemia Paternal Uncle Reece 70        subtype unk    Breast cancer Other      Celiac disease Neg Hx      Colon cancer Neg Hx      Colon polyps Neg Hx      Crohn's disease Neg Hx      Cystic fibrosis Neg Hx      Hemochromatosis Neg Hx      Inflammatory bowel disease Neg Hx      Irritable bowel syndrome Neg Hx      Liver cancer Neg Hx      Rectal cancer Neg Hx      Stomach cancer Neg Hx      Ulcerative colitis Neg Hx      Neville's disease Neg Hx      Ovarian cancer Neg Hx       Medications  Current Medications[2]  Allergies  Review of patient's allergies indicates:  No Known Allergies    Review of Systems       See above   All other systems reviewed and are negative.    Objective:      Vitals: There were no vitals filed for this visit.  BMI: There is no height or weight on file to calculate BMI.   There is no height or weight on file to calculate BSA.    Physical Exam  Limited as virtual  Appears comfortable.         Laboratory Data: reviewed most recent   Imaging: reviewed most recent      General Education discussed:      1 in 11 women diagnosed with breast cancer in the [place] were under 44yo.       Individuals should undergo breast cancer risk assessment by age 25 years and be counseled regarding potential benefits, risks, and limitations of breast screening in the context of their risk stratification.       1. General education: (not patient " specific)    Risk factors associated with breast cancer are categorized into 2 groups: Modifiable and Non-modifiable. Modifiable risk factors include use of hormones, alcohol, smoking, diet and exercise. Non-modifiable risk factors include breast density, genetics, chest radiation, previous pregnancies, age of first period, and age of menopause.     Factors associated with greater breast cancer risk: (this list is not patient specific)  -Increasing age The risk of breast cancer increases with older age.  -Female sex  -White race (In the [place], the highest breast cancer risk occurs among White women, although breast cancer remains the most common cancer among women of every major ethnic/racial group )  -Weight and body fat in postmenopausal women -Obesity (defined as body mass index [BMI] >=30 kg/m2) is associated with an overall increase  in morbidity and mortality. However, the risk of breast cancer associated with BMI differs by menopausal status.   ?Postmenopausal women - A higher BMI and/or perimenopausal weight gain have been consistently associated with a higher risk of breast cancer among postmenopausal women. The association between a higher BMI and postmenopausal breast cancer risk may be mediated by higher estrogen levels resulting from the peripheral conversion of estrogen precursors (from adipose tissue) to estrogen   -Tall stature -women who were >175 cm (69 inches) tall were 20 percent more likely to develop breast cancer than those <160 cm (63 inches) tall.  -Benign breast disease  Benign breast disease represents a spectrum of disorders that come to clinical attention because of patient symptoms (such as breast pain), palpable lesions or other findings on physical examination, or as imaging abnormalities. Following establishment of a benign diagnosis, treatment in general is aimed at symptomatic relief and patient education.  Some benign breast diseases, such as atypical hyperplasia or  lobular carcinoma in situ, confer an increase in the patient's future risk of developing breast cancer and should lead to counseling about screening recommendations and risk reduction strategies. These lesions are considered as risk markers, rather than as premalignant lesions, because those cancers that subsequently develop are not necessarily in the area of the atypia and may occur in the contralateral breast.  Benign epithelial breast lesions can be classified histologically into three categories: nonproliferative, proliferative without atypia, and atypical hyperplasia. The categorization is based upon the degree of cellular proliferation and atypia.  -Dense breast tissue -- The density of breast tissue reflects the relative amount of glandular and connective tissue (parenchyma) to adipose tissue. Women with mammographically dense breast tissue, generally defined as dense tissue comprising >=75 percent of the breast, have a four to five times higher breast cancer risk compared with women of similar age with less or no dense tissue. Although breast density is a largely inherited trait, other factors can influence density. For example, lower density has been associated with higher levels of physical activity  and with a low-fat, high-carbohydrate diet. In postmenopausal women, estrogen and progesterone increase breast density  while the ER antagonist tamoxifen decreases breast density.  Despite the association of exogenous hormones with breast density, breast density is not strongly correlated with endogenous hormone levels. Breast tend to become more fatty with age.   Dense breasts -  occurring in an estimated 50% of women in their 40s, 40% in their 50s, and 25% of women older than 60. Density can make it more difficult to detect breast cancer and increases breast cancer risk, both of which suggest that additional imaging can improve early diagnosis of the disease.   -Bone mineral density -In multiple studies,  women with higher bone density have a higher breast cancer risk  -Hormonal factors:   Risk vs benefit of hormone replacement therapy varies per person and should be assessed individually with their provider/GYN.  -Reproductive factors -Earlier menarche (before age 12) or later menopause (after age 52), Nulliparity, Increasing age at first full-term pregnancy.   (Of note, It is estimated that for every 12 months of breastfeeding, there was a 4.3 percent reduction in the relative risk (RR) of breast cancer)  -Personal and family history of breast cancer  -Alcohol use and smoking  -Exposure to therapeutic ionizing radiation           2. Risk stratifying models:  There are several models available for stratifying breast cancer risk, and Rodrick-Elizabeth is presently the model utilized by [facility] and is a model recommended per current NCCN guidelines.      Educational videos:   What Is a TC Score?    https://youtu.be/Sohq8SPVxrV     What If I Have a High-Risk TC Score?     https://youtu.be/[email or web address]      The [place] Model for Breast Cancer risk estimates the absolute 5 year risk and lifetime risk of developing breast cancer. Family history includes only first degree relatives with breast cancer, which is not enough information to estimate the risk of a patient having BRCA mutation. It also underestimates the cancer risk for patients with extensive family history. The [place] Model is a good predictor of risk for populations but not for individuals. It adjusts risk for race/ethnicity. It may underestimate breast cancer risk in patients with atypical hyperplasia and strong family history. The [place] Model was NOT designed to estimate risk for: Women with a prior diagnosis of breast cancer, lobular carcinoma in situ (LCIS), or ductal carcinoma in situ (DCIS);  Women who have received previous radiation therapy to the chest for treatment of Hodgkin lymphoma;  Women with gene mutations in BRCA1 or BRCA2, or those who are  known to have certain genetic syndromes that increase risk for breast cancer; Women of age <35 or >85.    There are limitations to every model for risk assessment, particularly that TC can overestimate risk in women with atypical hyperplasia and dense breasts and that [place] underestimates risk for those with a strong family history of breast or ovarian cancers as well as non-white women with atypical hyperplasia which can make them appear to not be candidates for risk reducing therapies.         3. High risk patient screening:       INCREASED RISK SCREENING: per NCCN                          MRI breast:   The use of MRI for breast cancer detection is based on the concept of  tumor angiogenesis or neovascularity. Tumor-associated blood vessels have increased permeability, which leads to prompt uptake and release of gadolinium within the first one to two minutes after administration, leading to a pattern of rapid enhancement and washout on MRI.   Bilateral breast examination - Both breasts should be evaluated in an MRI study, for comparison purposes, even when concern about possible pathology involves only one breast.  Contrast - Intravenous gadolinium contrast must be used to maximize cancer detection and is administered before breast MRI to highlight the neovascularity associated with cancers. Contrast is not necessary when the study is performed to evaluate silicone implant integrity.  Allergic and anaphylactoid reactions to gadolinium are rare, but can occur. In addition, in patients with renal failure, gadolinium can cause contrast nephropathy and/or nephrogenic systemic fibrosis.   A few studies have also reported gadolinium deposition in the brain from repeated intravenous administration, with the degree of deposition varying based on the specific contrast agent. The clinical significance of this deposition remains unknown, and no data for humans exist to show any adverse effects or harm at this time.     FDA  Drug Safety Communication: FDA identifies no harmful effects to date with brain retention of gadolinium-based contrast agents for MRIs;   review to continue: https:// www.fda.gov/Drugs/DrugSafety/vfg120357.htm    -Contact insurance company with regards to coverage of MRI breasts.   -Cannot undergo an MRI if pregnant.   -MRI's may have false positives                 LIZETTE (contrast enhanced mammogram):   LIZETTE is a breast imaging technique that uses an iodine-based intravenous (IV) contrast agent in combination with a standard digital mammogram   LIZETTE is the main alternative for anyone who benefits by but cannot have a breast MRI with IV contrast.    Main indications:   High risk screening   Suspicious clinical symptoms with inconclusive mammogram and ultrasound   New breast cancer, evaluate extent of disease   Pre and post chris-adjuvant systemic therapy evaluation     For high-risk screening, LIZETTE replaces the regular non-contrast mammogram and any other supplemental test     Note: A study comparing various types of scans had found that mammography enhanced with iodine-based dye can detect three times as many invasive cancers in dense breast tissue as ultrasound and contrast-enhanced mammography can find tumors that are much smaller than those found by regular mammography. M.R.I.s are better at detecting more tumors than standard mammograms, the study found, but are considerably more expensive.   M.R.I. scans turned up 17.4 cancers per 1,000 exams, while ultrasounds found only 4.2 cancers per 1,000 exams. Contrast-enhanced mammograms detected 19.2 cancers per 1,000 exams, but the difference between M.R.I. and contrast-enhanced mammography was not statistically significant..   With that being said, gold standard is having a mammogram and an MRI breast once a year. Guidelines do state that if a women cannot get the MRI, then a contrast enhanced mammogram can be done.     For age over 75 years, screening recommendations are  considered on an individual basis.       ACR guidelines:    Note: New American College of Radiology® (ACR®) breast cancer screening guidelines  now call for all women -- particularly Black and Ashkenazi Uatsdin women -- to have risk assessment by age 25 to determine if screening earlier than age 40 is needed. The ACR continues to recommend annual screening starting at age 40 for women of average risk, but earlier and more intensive screening for high-risk patients. The new ACR guidelines  for high-risk women were published online May 3 in the Journal of the American College of Radiology (JACR ).    Early detection decreases breast cancer death. The ACR recommends annual screening beginning at age 40 for women of average risk and earlier and/or more intensive screening for women at higher-than-average risk. For most women at higher-than-average risk, the supplemental screening method of choice is breast MRI. Women with genetics-based increased risk, those with a calculated lifetime risk of 20% or more, and those exposed to chest radiation at young ages are recommended to undergo MRI surveillance starting at ages 25 to 30 and annual mammography (with a variable starting age between 25 and 40, depending on the type of risk). Mutation carriers can delay mammographic screening until age 40 if annual screening breast MRI is performed as recommended. Women diagnosed with breast cancer before age 50 or with personal histories of breast cancer and dense breasts should undergo annual supplemental breast MRI. Others with personal histories, and those with atypia at biopsy, should strongly consider MRI screening, especially if other risk factors are present. For women with dense breasts who desire supplemental screening, breast MRI is recommended. For those who qualify for but cannot undergo breast MRI, contrast-enhanced mammography or ultrasound could be considered. All women should undergo risk assessment by age 25, especially  Black women and women of Ashkenazi Mormonism heritage, so that those at higher-than-average risk can be identified and appropriate screening initiated.      -RECOMMENDED LIFESTYLE MODIFICATIONS FOR HIGH RISK PATIENTS:    * Reviewed Lifestyle modifications which have shown benefit:  Limit alcohol consumption to less than 1 drink per day (1 ounce liquor, 6 oz wine, 8 oz beer) and no more than 3 drinks per week.   Avoid smoking.  Exercise at least 150 minutes per week of moderate intensity aerobic activity or at least 75 minutes of vigorous activity. Exercise can lower the relative risk of breast cancer by ~18-20%.  Maintain healthy weight and avoid post-menopausal weight gain. Avoid processed foods and eat more lean proteins, fruits and vegetables.                               * Available resources include genetic counseling, nutrition, weight management.    -requirements for Bariatric surgery. BMI must be >40 with no comorbidities or 35> with at least two comorbidities pertaining obesity (Htn, type 2 diabetes, Marvel, Osteoarthritis, and `  hyperlipidemia)          -CHEMOPREVENTION:                * For women at high risk for breast cancer, endocrine therapy can reduce the risk of invasive and/or in situ breast cancers. (tamoxifen for premenopausal or postmenopausal women and raloxifene or exemestane for postmenopausal women).   -Above have been shown to lower the risk of breast cancer incidence, however there is no survival benefit in patients who don't have breast cancer.        Tamoxifen:    Data regarding tamoxifen risk reduction are limited to pre- and postmenopausal individuals >=35 years of age with a [place] Model 5-year breast cancer risk of >=1.7% or a 10-year risk by TOM/Tyrer-Cuzicke of >=5% or a history of LCIS.  Tamoxifen: 20 mg per day for 5 years was shown to reduce risk of breast cancer by 49%. Among individuals with a history of AH, this dose and duration of tamoxifen were associated with an 86% reduction  in breast cancer risk. Low-dose tamoxifen (5 mg per day for 3-5 years)d is an option if patient is symptomatic on the 20-mg dose or if patient is unwilling or unable to take standard-dose tamoxifen.1 This low dosage needs further investigation in premenopausal individuals.  The efficacy of tamoxifen risk reduction in individuals who are carriers of BRCA1/2 and other pathogenic mutations is less well studied than in other risk groups. Limited data suggest there may be a benefit, likely a larger benefit, for BRCA2 carriers.  For healthy, premenopausal individuals at elevated risk for breast cancer, data regarding the risk/benefit ratio for tamoxifen appear relatively favorable (category 1).  For postmenopausal individuals at elevated risk for breast cancer, data regarding the risk/benefit ratio for tamoxifen are influenced by age, presence of uterus, or comorbid conditions (category 1). There are insufficient data on ethnicity and rac  -Risks of Tamoxifen side effects include hot flashes, invasive endometrial cancer in women > 49 years of age (2.3/1000 compared to 0.9/1000), cataracts, increased risk of pulmonary embolism among others.    Raloxifene:    Data regarding raloxifene risk reduction are limited to postmenopausal individuals >=35 years of age with a [place] Model 5-year breast cancer risk >=1.7% or a 10-year risk by TOM/Tyrer-Cuzicke of >=5% or a history of LCIS.  Raloxifene: 60 mg per day was found to be equivalent to tamoxifen for breast cancer risk reduction in the initial comparison. While raloxifene in long-term follow-up appears to be less efficacious in risk reduction than tamoxifen, consideration of toxicity may still lead to the choice of raloxifene over tamoxifen in individuals with an intact uterus.  There are no data regarding the use of raloxifene in individuals who are carriers of BRCA1/2 and other pathogenic mutations or who have had prior thoracic radiation.  For postmenopausal individuals at  elevated risk for breast cancer, data regarding the risk/benefit ratio for raloxifene are influenced by age or comorbid conditions (category 1). There are insufficient data on ethnicity and race.  Use of raloxifene for breast cancer risk reduction in premenopausal individuals is inappropriate unless part of a clinical trial.     Aromatase Inhibitors (exemestane and anastrozole)   Data regarding exemestane are from a single large randomized study limited to postmenopausal individuals >=35 years of age with a [place] Model 5-year breast cancer risk >=1.7% or a 10-year risk by TOM/Tyrer-Cuzicke of >=5% or a history of LCIS.  Data regarding anastrozole are from a single large randomized study limited to postmenopausal individuals 40 to 70 years of age with the following risk compared with the general population: Aged 40 to 44 years - 4 times higher Aged 45 to 60 years - >=2 times higher Aged 60 to 70 years - >=1.5 times higher Individuals who did not meet these criteria but had a Tyrer-Cuzicke model 10-year breast cancer risk >5% were also included.  Exemestane: 25 mg per day was found to reduce the relative incidence of invasive breast cancer by 65% from 0.55% to 0.19% with a median follow-up of 3 years.  Anastrozole: 1 mg per day was found to reduce the relative incidence of breast cancer by 53% with a median follow-up of 5 years.  There are retrospective data that aromatase inhibitors can reduce the risk of contralateral breast cancer in BRCA1/2 patients with ER-positive breast cancer who take aromatase inhibitors as adjuvant agents.  For postmenopausal individuals at elevated risk for breast cancer, data regarding the risk/benefit ratio for aromatase inhibitor agents are influenced by age and comorbid conditions such as osteoporosis (category 1). There are insufficient data on ethnicity and race.  Use of aromatase inhibitors for breast cancer risk reduction in premenopausal individuals is inappropriate          Assessment:     1. At risk for breast cancer  -     Ambulatory referral/consult to Breast Surgery    2. Family history of pancreatic cancer    3. Family history of breast cancer           Breast Cancer Risk Stratification   Current, Estimated Breast Cancer Risk Model Used Patient's Score Risk for general population Patient's Risk Category   5-year [place] Model % %  [x] N/A given age <35   [] Average risk (<1.7%)   [] Increased risk (>=1.7%)   10-year Tyrer-Cuzick v8.0b 1.41%   [x] <5%   [] >=5%    Lifetime (to age 85) Tyrer-Cuzick v8.0b 16-21%  12.15%  [] Average risk (<15%)   [x] Intermediate risk (>=15% - <20%)   [x] High risk (>=20%)   According to the [employer], patients with a lifetime breast cancer risk of 20% or higher might benefit from supplemental screening exams. Women with a 5-year risk greater than or equal to 1.7% may benefit from chemoprevention agents (tamoxifen, raloxifene, aromatase inhibitors) to reduce risk.        Patient's risk factors include but are not limited to:  Family history     Plan:     Patient's TC score places her in the intermediate/high risk category for developing a breast cancer depending on whether her paternal grandmother's likely breast cancer was included. We discussed risk and guidelines associated with above. .   At this time, MMG and MRI breast to start at age 38 would be appropriate, however, we will reevaluate at age 33 with any updated history/guidelines.   Recommend annual CBE with GYN/PCP.  Patient does not qualify for chemoprevention at this time.  Encourage breast awareness, including monthly breast self-exams.   Recommend lifestyle modifications as above.   In regard to genetic testing, I would recommend due to pancreatic and breast cancer in her maternal grandmother as this is concerning. She may ask her mom to be tested as to not have issues with future insurance.     RTC at age 33 to see me.     Questions were encouraged and answered to  patient's satisfaction, and patient verbalized understanding of information and agreement with the plan. Advised patient to RTC with any interval changes or concerns.        Route Chart for Scheduling    Med Onc Chart Routing      Follow up with physician    Follow up with [facility] . 3 years   Infusion scheduling note    Injection scheduling note    Labs    Imaging    Pharmacy appointment    Other referrals            Patient is in agreement with the proposed treatment plan. All questions were answered to the patient's satisfaction. Pt knows to call clinic for any new or worsening symptoms and if anything is needed before the next clinic visit.    Nasreen Barnes, MSN, APRN, FNP-C  [facility]  Hematology & Medical Oncology  [address] [place] [id number]  ph. [phone] ext [phone]  Fax. [phone]                   code applied: patient requires or will require a continuous, longitudinal, and active collaborative plan of care related to this patient's health condition, high risk cancer --the management of which requires the direction of a practitioner with specialized clinical knowledge, skill, and expertise.                [1]   Patient Active Problem List  Diagnosis    Epiploic appendagitis - seen on CT 4/2022    Obesity due to excess calories with serious comorbidity    Metabolic dysfunction-associated steatotic liver disease (MASLD)    Other specified attention deficit hyperactivity disorder (ADHD) - dx 10/2024   [2]   Current Outpatient Medications:     drospirenone-ethinyl estradioL (KHADIJAH) 3-0.02 mg per tablet, Take 1 tablet by mouth once daily., Disp: 84 tablet, Rfl: 3    serdexmethylphen-dexmethylphen (AZSTARYS) 26.1 mg- 5.2 mg Cap, Take 1 capsule by mouth once daily., Disp: 30 capsule, Rfl: 0    spironolactone (ALDACTONE) 100 MG tablet, Take 100 mg by mouth every evening., Disp: , Rfl:     tirzepatide, weight loss, (ZEPBOUND) 5 mg/0.5 mL Soln, Inject 0.5 mLs (5 mg total)  into the skin every 7 days., Disp: 2 mL, Rfl: 3

## 2025-07-30 ENCOUNTER — PATIENT MESSAGE (OUTPATIENT)
Dept: PSYCHIATRY | Facility: CLINIC | Age: 30
End: 2025-07-30
Payer: COMMERCIAL

## 2025-07-30 DIAGNOSIS — Z30.41 ENCOUNTER FOR SURVEILLANCE OF CONTRACEPTIVE PILLS: ICD-10-CM

## 2025-07-30 RX ORDER — DROSPIRENONE AND ETHINYL ESTRADIOL 0.02-3(28)
1 KIT ORAL
Qty: 84 TABLET | Refills: 0 | Status: SHIPPED | OUTPATIENT
Start: 2025-07-30

## 2025-07-30 NOTE — TELEPHONE ENCOUNTER
Refill Routing Note   Medication(s) are not appropriate for processing by [facility] for the following reason(s):        Drug-drug interaction  Drug-disease interaction    [facility] action(s):  Defer        Medication Therapy Plan: OV 6/27/25 with ALICIA Serrano PA-C. FOV with ALICIA Serrano PA-C. Diff Dept as Dr. Peñaloza. does not meet OBGYN OV workflow    Pharmacist review requested: Yes     Appointments  past 12m or future 3m with PCP    Date Provider   Last Visit   8/26/2024 Susan Peñaloza MD   Next Visit   Visit date not found Susan Peñaloza MD   ED visits in past 90 days: 0        Note composed:7:56 AM 07/30/2025

## 2025-07-30 NOTE — TELEPHONE ENCOUNTER
Refill Routing Note   Medication(s) are not appropriate for processing by [facility] for the following reason(s):        Drug-drug interaction  Drug-disease interaction    [facility] action(s):  Defer           Pharmacist review requested: Yes     Appointments  past 12m or future 3m with PCP    Date Provider   Last Visit   8/26/2024 Susan Peñaloza MD   Next Visit   Visit date not found Susan Peñaloza MD   ED visits in past 90 days: 0        Note composed:11:04 AM 07/30/2025

## 2025-07-31 DIAGNOSIS — F90.2 ADHD (ATTENTION DEFICIT HYPERACTIVITY DISORDER), COMBINED TYPE: Primary | ICD-10-CM

## 2025-07-31 RX ORDER — SERDEXMETHYLPHENIDATE AND DEXMETHYLPHENIDATE 7.8; 39.2 MG/1; MG/1
1 CAPSULE ORAL DAILY
Qty: 30 CAPSULE | Refills: 0 | Status: SHIPPED | OUTPATIENT
Start: 2025-07-31

## 2025-08-07 ENCOUNTER — HOSPITAL ENCOUNTER (OUTPATIENT)
Dept: RADIOLOGY | Facility: HOSPITAL | Age: 30
Discharge: HOME | End: 2025-08-07
Attending: NURSE PRACTITIONER
Payer: COMMERCIAL

## 2025-08-07 ENCOUNTER — PROCEDURE VISIT (OUTPATIENT)
Dept: HEPATOLOGY | Facility: CLINIC | Age: 30
End: 2025-08-07
Payer: COMMERCIAL

## 2025-08-07 DIAGNOSIS — K76.0 METABOLIC DYSFUNCTION-ASSOCIATED STEATOTIC LIVER DISEASE (MASLD): ICD-10-CM

## 2025-08-07 PROCEDURE — 91200 LIVER ELASTOGRAPHY: CPT | Mod: S$GLB,,, | Performed by: NURSE PRACTITIONER

## 2025-08-07 PROCEDURE — 76700 US EXAM ABDOM COMPLETE: CPT | Mod: 26,,, | Performed by: STUDENT IN AN ORGANIZED HEALTH CARE EDUCATION/TRAINING PROGRAM

## 2025-08-07 PROCEDURE — 76700 US EXAM ABDOM COMPLETE: CPT | Mod: TC

## 2025-08-12 ENCOUNTER — TELEPHONE (OUTPATIENT)
Dept: HEPATOLOGY | Facility: CLINIC | Age: 30
End: 2025-08-12
Payer: COMMERCIAL

## 2025-08-17 ENCOUNTER — HOSPITAL ENCOUNTER (OUTPATIENT)
Dept: RADIOLOGY | Facility: HOSPITAL | Age: 30
Discharge: HOME | End: 2025-08-17
Attending: NURSE PRACTITIONER
Payer: COMMERCIAL

## 2025-08-17 DIAGNOSIS — K76.9 LIVER LESION: ICD-10-CM

## 2025-08-17 DIAGNOSIS — K76.9 LIVER DISEASE, UNSPECIFIED: ICD-10-CM

## 2025-08-17 PROCEDURE — 74183 MRI ABD W/O CNTR FLWD CNTR: CPT | Mod: 26,,, | Performed by: RADIOLOGY

## 2025-08-17 PROCEDURE — 25500020 PHARM REV CODE 255: Performed by: NURSE PRACTITIONER

## 2025-08-17 PROCEDURE — A9585 GADOBUTROL INJECTION: HCPCS | Performed by: NURSE PRACTITIONER

## 2025-08-17 PROCEDURE — 74183 MRI ABD W/O CNTR FLWD CNTR: CPT | Mod: TC

## 2025-08-17 RX ORDER — GADOBUTROL 604.72 MG/ML
10 INJECTION INTRAVENOUS
Status: COMPLETED | OUTPATIENT
Start: 2025-08-17 | End: 2025-08-17

## 2025-08-17 RX ADMIN — GADOBUTROL 10 ML: 604.72 INJECTION INTRAVENOUS at 02:08

## 2025-08-18 DIAGNOSIS — E66.812 CLASS 2 SEVERE OBESITY DUE TO EXCESS CALORIES WITH SERIOUS COMORBIDITY AND BODY MASS INDEX (BMI) OF 38.0 TO 38.9 IN ADULT: ICD-10-CM

## 2025-08-18 DIAGNOSIS — E66.01 CLASS 2 SEVERE OBESITY DUE TO EXCESS CALORIES WITH SERIOUS COMORBIDITY AND BODY MASS INDEX (BMI) OF 38.0 TO 38.9 IN ADULT: ICD-10-CM

## 2025-08-18 RX ORDER — TIRZEPATIDE 5 MG/.5ML
INJECTION, SOLUTION SUBCUTANEOUS
Qty: 2 ML | Refills: 3 | Status: SHIPPED | OUTPATIENT
Start: 2025-08-18

## 2025-08-26 ENCOUNTER — OFFICE VISIT (OUTPATIENT)
Dept: HEPATOLOGY | Facility: CLINIC | Age: 30
End: 2025-08-26
Payer: COMMERCIAL

## 2025-08-26 VITALS — BODY MASS INDEX: 26.09 KG/M2 | WEIGHT: 141.75 LBS | HEIGHT: 62 IN

## 2025-08-26 DIAGNOSIS — Z87.19 HISTORY OF FATTY INFILTRATION OF LIVER: Primary | ICD-10-CM

## 2025-08-26 PROBLEM — K76.0 METABOLIC DYSFUNCTION-ASSOCIATED STEATOTIC LIVER DISEASE (MASLD): Status: RESOLVED | Noted: 2022-05-02 | Resolved: 2025-08-26

## 2025-08-26 PROBLEM — E66.09 OBESITY DUE TO EXCESS CALORIES WITH SERIOUS COMORBIDITY: Status: RESOLVED | Noted: 2022-04-20 | Resolved: 2025-08-26

## 2025-08-26 PROCEDURE — 99999 PR PBB SHADOW E&M-EST. PATIENT-LVL III: CPT | Mod: PBBFAC,,, | Performed by: NURSE PRACTITIONER

## 2025-08-26 PROCEDURE — 3044F HG A1C LEVEL LT 7.0%: CPT | Mod: CPTII,S$GLB,, | Performed by: NURSE PRACTITIONER

## 2025-08-26 PROCEDURE — 3008F BODY MASS INDEX DOCD: CPT | Mod: CPTII,S$GLB,, | Performed by: NURSE PRACTITIONER

## 2025-08-26 PROCEDURE — 1160F RVW MEDS BY RX/DR IN RCRD: CPT | Mod: CPTII,S$GLB,, | Performed by: NURSE PRACTITIONER

## 2025-08-26 PROCEDURE — 99214 OFFICE O/P EST MOD 30 MIN: CPT | Mod: S$GLB,,, | Performed by: NURSE PRACTITIONER

## 2025-08-26 PROCEDURE — 1159F MED LIST DOCD IN RCRD: CPT | Mod: CPTII,S$GLB,, | Performed by: NURSE PRACTITIONER

## 2025-08-26 RX ORDER — LISDEXAMFETAMINE DIMESYLATE 30 MG/1
30 CAPSULE ORAL EVERY MORNING
COMMUNITY

## 2025-08-29 ENCOUNTER — TELEPHONE (OUTPATIENT)
Dept: HEPATOLOGY | Facility: CLINIC | Age: 30
End: 2025-08-29
Payer: COMMERCIAL

## 2025-08-29 DIAGNOSIS — K76.9 LIVER LESION: ICD-10-CM

## 2025-08-29 DIAGNOSIS — Z87.19 HISTORY OF FATTY INFILTRATION OF LIVER: Primary | ICD-10-CM
